# Patient Record
Sex: FEMALE | Race: WHITE | NOT HISPANIC OR LATINO | Employment: UNEMPLOYED | URBAN - METROPOLITAN AREA
[De-identification: names, ages, dates, MRNs, and addresses within clinical notes are randomized per-mention and may not be internally consistent; named-entity substitution may affect disease eponyms.]

---

## 2021-04-11 ENCOUNTER — APPOINTMENT (EMERGENCY)
Dept: RADIOLOGY | Facility: HOSPITAL | Age: 46
DRG: 189 | End: 2021-04-11
Payer: MEDICARE

## 2021-04-11 ENCOUNTER — HOSPITAL ENCOUNTER (INPATIENT)
Facility: HOSPITAL | Age: 46
LOS: 7 days | Discharge: HOME HEALTH | DRG: 189 | End: 2021-04-18
Attending: EMERGENCY MEDICINE | Admitting: INTERNAL MEDICINE
Payer: MEDICARE

## 2021-04-11 DIAGNOSIS — F03.90 DEMENTIA WITHOUT BEHAVIORAL DISTURBANCE, UNSPECIFIED DEMENTIA TYPE (HCC): ICD-10-CM

## 2021-04-11 DIAGNOSIS — R09.02 HYPOXIA: ICD-10-CM

## 2021-04-11 DIAGNOSIS — T17.908A ASPIRATION INTO AIRWAY, INITIAL ENCOUNTER: ICD-10-CM

## 2021-04-11 DIAGNOSIS — E87.2 LACTIC ACIDOSIS: ICD-10-CM

## 2021-04-11 DIAGNOSIS — R71.8 ELEVATED MCV: ICD-10-CM

## 2021-04-11 DIAGNOSIS — Q90.9 DOWN SYNDROME: ICD-10-CM

## 2021-04-11 DIAGNOSIS — T17.308A CHOKING, INITIAL ENCOUNTER: Primary | ICD-10-CM

## 2021-04-11 PROBLEM — E87.29 RESPIRATORY ACIDOSIS: Status: ACTIVE | Noted: 2021-04-11

## 2021-04-11 PROBLEM — I46.9 CARDIAC ARREST (HCC): Status: ACTIVE | Noted: 2021-04-11

## 2021-04-11 PROBLEM — R79.89 ELEVATED LACTIC ACID LEVEL: Status: ACTIVE | Noted: 2021-04-11

## 2021-04-11 LAB
ALBUMIN SERPL BCP-MCNC: 3.1 G/DL (ref 3.5–5)
ALP SERPL-CCNC: 183 U/L (ref 46–116)
ALT SERPL W P-5'-P-CCNC: 67 U/L (ref 12–78)
ANION GAP SERPL CALCULATED.3IONS-SCNC: 8 MMOL/L (ref 4–13)
AST SERPL W P-5'-P-CCNC: 57 U/L (ref 5–45)
ATRIAL RATE: 375 BPM
BASE EX.OXY STD BLDV CALC-SCNC: 73.5 % (ref 60–80)
BASE EX.OXY STD BLDV CALC-SCNC: 79.2 % (ref 60–80)
BASE EXCESS BLDV CALC-SCNC: -0.6 MMOL/L
BASE EXCESS BLDV CALC-SCNC: 2.5 MMOL/L
BASOPHILS # BLD MANUAL: 0.05 THOUSAND/UL (ref 0–0.1)
BASOPHILS NFR MAR MANUAL: 1 % (ref 0–1)
BILIRUB SERPL-MCNC: 0.62 MG/DL (ref 0.2–1)
BUN SERPL-MCNC: 20 MG/DL (ref 5–25)
CALCIUM ALBUM COR SERPL-MCNC: 8.4 MG/DL (ref 8.3–10.1)
CALCIUM SERPL-MCNC: 7.7 MG/DL (ref 8.3–10.1)
CHLORIDE SERPL-SCNC: 105 MMOL/L (ref 100–108)
CO2 SERPL-SCNC: 29 MMOL/L (ref 21–32)
CREAT SERPL-MCNC: 1.18 MG/DL (ref 0.6–1.3)
EOSINOPHIL # BLD MANUAL: 0 THOUSAND/UL (ref 0–0.4)
EOSINOPHIL NFR BLD MANUAL: 0 % (ref 0–6)
ERYTHROCYTE [DISTWIDTH] IN BLOOD BY AUTOMATED COUNT: 14.4 % (ref 11.6–15.1)
GFR SERPL CREATININE-BSD FRML MDRD: 55 ML/MIN/1.73SQ M
GLUCOSE SERPL-MCNC: 147 MG/DL (ref 65–140)
HCG SERPL QL: NEGATIVE
HCO3 BLDV-SCNC: 28.1 MMOL/L (ref 24–30)
HCO3 BLDV-SCNC: 29.6 MMOL/L (ref 24–30)
HCT VFR BLD AUTO: 40.3 % (ref 34.8–46.1)
HGB BLD-MCNC: 13.1 G/DL (ref 11.5–15.4)
INR PPP: 1.02 (ref 0.84–1.19)
LACTATE SERPL-SCNC: 1.5 MMOL/L (ref 0.5–2)
LACTATE SERPL-SCNC: 2.7 MMOL/L (ref 0.5–2)
LYMPHOCYTES # BLD AUTO: 0.94 THOUSAND/UL (ref 0.6–4.47)
LYMPHOCYTES # BLD AUTO: 18 % (ref 14–44)
MCH RBC QN AUTO: 34.4 PG (ref 26.8–34.3)
MCHC RBC AUTO-ENTMCNC: 32.5 G/DL (ref 31.4–37.4)
MCV RBC AUTO: 106 FL (ref 82–98)
MONOCYTES # BLD AUTO: 0.26 THOUSAND/UL (ref 0–1.22)
MONOCYTES NFR BLD: 5 % (ref 4–12)
NEUTROPHILS # BLD MANUAL: 3.97 THOUSAND/UL (ref 1.85–7.62)
NEUTS BAND NFR BLD MANUAL: 5 % (ref 0–8)
NEUTS SEG NFR BLD AUTO: 71 % (ref 43–75)
NRBC BLD AUTO-RTO: 0 /100 WBCS
NT-PROBNP SERPL-MCNC: 58 PG/ML
O2 CT BLDV-SCNC: 14.6 ML/DL
O2 CT BLDV-SCNC: 15.6 ML/DL
PCO2 BLDV: 56.2 MM HG (ref 42–50)
PCO2 BLDV: 64.6 MM HG (ref 42–50)
PH BLDV: 7.26 [PH] (ref 7.3–7.4)
PH BLDV: 7.34 [PH] (ref 7.3–7.4)
PLATELET # BLD AUTO: 157 THOUSANDS/UL (ref 149–390)
PLATELET BLD QL SMEAR: ADEQUATE
PMV BLD AUTO: 11.3 FL (ref 8.9–12.7)
PO2 BLDV: 42.9 MM HG (ref 35–45)
PO2 BLDV: 44.1 MM HG (ref 35–45)
POTASSIUM SERPL-SCNC: 3.7 MMOL/L (ref 3.5–5.3)
PROT SERPL-MCNC: 7.3 G/DL (ref 6.4–8.2)
PROTHROMBIN TIME: 13.5 SECONDS (ref 11.6–14.5)
QRS AXIS: 59 DEGREES
QRSD INTERVAL: 72 MS
QT INTERVAL: 378 MS
QTC INTERVAL: 408 MS
RBC # BLD AUTO: 3.81 MILLION/UL (ref 3.81–5.12)
SODIUM SERPL-SCNC: 142 MMOL/L (ref 136–145)
T WAVE AXIS: 54 DEGREES
TOTAL CELLS COUNTED SPEC: 100
TROPONIN I SERPL-MCNC: 0.06 NG/ML
TROPONIN I SERPL-MCNC: <0.02 NG/ML
VENTRICULAR RATE: 70 BPM
WBC # BLD AUTO: 5.22 THOUSAND/UL (ref 4.31–10.16)

## 2021-04-11 PROCEDURE — 84484 ASSAY OF TROPONIN QUANT: CPT | Performed by: EMERGENCY MEDICINE

## 2021-04-11 PROCEDURE — 93005 ELECTROCARDIOGRAM TRACING: CPT

## 2021-04-11 PROCEDURE — 36415 COLL VENOUS BLD VENIPUNCTURE: CPT | Performed by: EMERGENCY MEDICINE

## 2021-04-11 PROCEDURE — 85027 COMPLETE CBC AUTOMATED: CPT | Performed by: EMERGENCY MEDICINE

## 2021-04-11 PROCEDURE — 87040 BLOOD CULTURE FOR BACTERIA: CPT | Performed by: EMERGENCY MEDICINE

## 2021-04-11 PROCEDURE — 80053 COMPREHEN METABOLIC PANEL: CPT | Performed by: EMERGENCY MEDICINE

## 2021-04-11 PROCEDURE — 99223 1ST HOSP IP/OBS HIGH 75: CPT | Performed by: INTERNAL MEDICINE

## 2021-04-11 PROCEDURE — 99291 CRITICAL CARE FIRST HOUR: CPT

## 2021-04-11 PROCEDURE — 99285 EMERGENCY DEPT VISIT HI MDM: CPT | Performed by: EMERGENCY MEDICINE

## 2021-04-11 PROCEDURE — 71045 X-RAY EXAM CHEST 1 VIEW: CPT

## 2021-04-11 PROCEDURE — 84703 CHORIONIC GONADOTROPIN ASSAY: CPT | Performed by: EMERGENCY MEDICINE

## 2021-04-11 PROCEDURE — 85610 PROTHROMBIN TIME: CPT | Performed by: EMERGENCY MEDICINE

## 2021-04-11 PROCEDURE — 82805 BLOOD GASES W/O2 SATURATION: CPT | Performed by: EMERGENCY MEDICINE

## 2021-04-11 PROCEDURE — 83605 ASSAY OF LACTIC ACID: CPT | Performed by: EMERGENCY MEDICINE

## 2021-04-11 PROCEDURE — 84484 ASSAY OF TROPONIN QUANT: CPT | Performed by: INTERNAL MEDICINE

## 2021-04-11 PROCEDURE — 94664 DEMO&/EVAL PT USE INHALER: CPT

## 2021-04-11 PROCEDURE — 83605 ASSAY OF LACTIC ACID: CPT | Performed by: INTERNAL MEDICINE

## 2021-04-11 PROCEDURE — 85007 BL SMEAR W/DIFF WBC COUNT: CPT | Performed by: EMERGENCY MEDICINE

## 2021-04-11 PROCEDURE — 84145 PROCALCITONIN (PCT): CPT | Performed by: EMERGENCY MEDICINE

## 2021-04-11 PROCEDURE — 96365 THER/PROPH/DIAG IV INF INIT: CPT

## 2021-04-11 PROCEDURE — 83880 ASSAY OF NATRIURETIC PEPTIDE: CPT | Performed by: EMERGENCY MEDICINE

## 2021-04-11 PROCEDURE — 93010 ELECTROCARDIOGRAM REPORT: CPT | Performed by: INTERNAL MEDICINE

## 2021-04-11 RX ORDER — OLANZAPINE 5 MG/1
5 TABLET ORAL 2 TIMES DAILY
COMMUNITY

## 2021-04-11 RX ORDER — LORAZEPAM 1 MG/1
1 TABLET ORAL EVERY 8 HOURS PRN
Status: DISCONTINUED | OUTPATIENT
Start: 2021-04-11 | End: 2021-04-18 | Stop reason: HOSPADM

## 2021-04-11 RX ORDER — LORAZEPAM 1 MG/1
0.5 TABLET ORAL 3 TIMES DAILY
COMMUNITY

## 2021-04-11 RX ORDER — QUETIAPINE FUMARATE 25 MG/1
25 TABLET, FILM COATED ORAL DAILY
Status: DISCONTINUED | OUTPATIENT
Start: 2021-04-12 | End: 2021-04-18 | Stop reason: HOSPADM

## 2021-04-11 RX ORDER — ESCITALOPRAM OXALATE 20 MG/1
20 TABLET ORAL
Status: DISCONTINUED | OUTPATIENT
Start: 2021-04-11 | End: 2021-04-18 | Stop reason: HOSPADM

## 2021-04-11 RX ORDER — ESCITALOPRAM OXALATE 10 MG/1
10 TABLET ORAL DAILY
COMMUNITY

## 2021-04-11 RX ORDER — KETAMINE HCL IN NACL, ISO-OSM 100MG/10ML
1 SYRINGE (ML) INJECTION ONCE
Status: DISCONTINUED | OUTPATIENT
Start: 2021-04-11 | End: 2021-04-11

## 2021-04-11 RX ORDER — CLONAZEPAM 1 MG/1
1 TABLET ORAL
Status: ON HOLD | COMMUNITY
End: 2022-03-16

## 2021-04-11 RX ORDER — QUETIAPINE FUMARATE 25 MG/1
12.5 TABLET, FILM COATED ORAL 2 TIMES DAILY
COMMUNITY

## 2021-04-11 RX ORDER — KETAMINE HCL IN NACL, ISO-OSM 100MG/10ML
SYRINGE (ML) INJECTION
Status: DISCONTINUED
Start: 2021-04-11 | End: 2021-04-11

## 2021-04-11 RX ORDER — ESCITALOPRAM OXALATE 10 MG/1
10 TABLET ORAL DAILY
Status: DISCONTINUED | OUTPATIENT
Start: 2021-04-12 | End: 2021-04-18 | Stop reason: HOSPADM

## 2021-04-11 RX ORDER — QUETIAPINE FUMARATE 50 MG/1
50 TABLET, FILM COATED ORAL
COMMUNITY
End: 2022-03-10

## 2021-04-11 RX ORDER — KETAMINE HYDROCHLORIDE 50 MG/ML
INJECTION, SOLUTION, CONCENTRATE INTRAMUSCULAR; INTRAVENOUS
Status: DISCONTINUED
Start: 2021-04-11 | End: 2021-04-11

## 2021-04-11 RX ORDER — DONEPEZIL HYDROCHLORIDE 5 MG/1
5 TABLET, FILM COATED ORAL
Status: DISCONTINUED | OUTPATIENT
Start: 2021-04-11 | End: 2021-04-18 | Stop reason: HOSPADM

## 2021-04-11 RX ORDER — QUETIAPINE FUMARATE 25 MG/1
50 TABLET, FILM COATED ORAL
Status: DISCONTINUED | OUTPATIENT
Start: 2021-04-11 | End: 2021-04-18 | Stop reason: HOSPADM

## 2021-04-11 RX ORDER — ESCITALOPRAM OXALATE 20 MG/1
20 TABLET ORAL
COMMUNITY
End: 2021-05-04 | Stop reason: SDUPTHER

## 2021-04-11 RX ORDER — OLANZAPINE 20 MG/1
5 TABLET ORAL DAILY
COMMUNITY
End: 2022-03-10

## 2021-04-11 RX ORDER — CLONAZEPAM 1 MG/1
1 TABLET ORAL 2 TIMES DAILY
Status: DISCONTINUED | OUTPATIENT
Start: 2021-04-11 | End: 2021-04-18 | Stop reason: HOSPADM

## 2021-04-11 RX ORDER — DONEPEZIL HYDROCHLORIDE 5 MG/1
5 TABLET, FILM COATED ORAL
COMMUNITY

## 2021-04-11 RX ORDER — SODIUM CHLORIDE 9 MG/ML
75 INJECTION, SOLUTION INTRAVENOUS CONTINUOUS
Status: DISCONTINUED | OUTPATIENT
Start: 2021-04-11 | End: 2021-04-14

## 2021-04-11 RX ORDER — OLANZAPINE 2.5 MG/1
5 TABLET ORAL 2 TIMES DAILY
Status: DISCONTINUED | OUTPATIENT
Start: 2021-04-12 | End: 2021-04-18 | Stop reason: HOSPADM

## 2021-04-11 RX ORDER — OLANZAPINE 10 MG/1
10 TABLET ORAL
Status: DISCONTINUED | OUTPATIENT
Start: 2021-04-11 | End: 2021-04-18 | Stop reason: HOSPADM

## 2021-04-11 RX ADMIN — DONEPEZIL HYDROCHLORIDE 5 MG: 5 TABLET, FILM COATED ORAL at 21:49

## 2021-04-11 RX ADMIN — QUETIAPINE FUMARATE 50 MG: 25 TABLET ORAL at 21:49

## 2021-04-11 RX ADMIN — AMPICILLIN SODIUM AND SULBACTAM SODIUM 3 G: 2; 1 INJECTION, POWDER, FOR SOLUTION INTRAMUSCULAR; INTRAVENOUS at 15:36

## 2021-04-11 RX ADMIN — SODIUM CHLORIDE 100 ML/HR: 0.9 INJECTION, SOLUTION INTRAVENOUS at 18:19

## 2021-04-11 RX ADMIN — OLANZAPINE 10 MG: 10 TABLET, FILM COATED ORAL at 21:49

## 2021-04-11 RX ADMIN — ESCITALOPRAM OXALATE 20 MG: 20 TABLET ORAL at 21:49

## 2021-04-11 RX ADMIN — LORAZEPAM 1 MG: 1 TABLET ORAL at 20:09

## 2021-04-11 NOTE — ED PROVIDER NOTES
History  Chief Complaint   Patient presents with    Aspiration w/severe Dyspnea     Pt choked on pie at group home and went unresponsive  CPR with no shock and achieved ROSC  SpO2 80s for EMS     HPI       25-year-old female with history of Down syndrome and dementia, nonverbal at baseline, presenting after choking on a piece of cherry pie  Patient went unresponsive at home shortly after choking  When EMS arrived at the scene they performed the Heimlich maneuver  She lost pulses so CPR was initiated  After a couple of rounds of CPR she had ROSC   EMS had difficulty oxygenating her EN route to the emergency department due to agitation  She was satting 73% on room air on arrival     I removed a few chunks of cherry pie from the patient's oral cavity on arrival   She was placed on mid flow nasal cannula at 8 L and is maintaining SpO2 in the high 90s  Lungs are clear to auscultation  Chest x-ray without consolidation  Patient is noted to be hypothermic to 95 5° on arrival   She was placed on a [employer]  She has a lactic acidosis to 2 7  No leukocytosis  She was covered empirically with Unasyn given concern for significant aspiration  Patient only meets 1 SIRS criteria in the emergency department and I suspect that her lactic acidosis is in the setting of prolonged episode of hypoxia rather than severe sepsis  Patient is alert and interactive at her baseline in the emergency department  She was evaluated at bedside by the critical care attending and nurse practitioner who agree that she is stable for the floor  She was admitted to [facility] in stable condition  Prior to Admission Medications   Prescriptions Last Dose Informant Patient Reported? Taking?    LORazepam (ATIVAN) 1 mg tablet 4/11/2021 at Unknown time Family Member Yes Yes   Sig: Take 1 mg by mouth every 8 (eight) hours as needed for anxiety   OLANZapine (ZyPREXA) 20 MG tablet 4/10/2021 at Unknown time Family Member Yes Yes   Sig: Take 10 mg by mouth daily at bedtime   OLANZapine (ZyPREXA) 5 mg tablet 4/11/2021 at Unknown time Family Member Yes Yes   Sig: Take 5 mg by mouth 2 (two) times a day   QUEtiapine (SEROquel) 25 mg tablet 4/11/2021 at Unknown time Family Member Yes Yes   Sig: Take 25 mg by mouth daily 12 noon   QUEtiapine (SEROquel) 50 mg tablet 4/10/2021 at Unknown time Family Member Yes Yes   Sig: Take 50 mg by mouth daily at bedtime   clonazePAM (KlonoPIN) 1 mg tablet 4/11/2021 at Unknown time Family Member Yes Yes   Sig: Take 1 mg by mouth 2 (two) times a day   donepezil (ARICEPT) 5 mg tablet 4/10/2021 at Unknown time Family Member Yes Yes   Sig: Take 5 mg by mouth daily at bedtime   escitalopram (LEXAPRO) 10 mg tablet 4/11/2021 at Unknown time Family Member Yes Yes   Sig: Take 10 mg by mouth daily   escitalopram (LEXAPRO) 20 mg tablet 4/10/2021 at Unknown time Family Member Yes Yes   Sig: Take 20 mg by mouth daily at bedtime      Facility-Administered Medications: None       Past Medical History:   Diagnosis Date    Anxiety     Dementia ([facility] [address] )     Depression     Down syndrome        History reviewed  No pertinent surgical history  History reviewed  No pertinent family history  I have reviewed and agree with the history as documented  E-Cigarette/Vaping     E-Cigarette/Vaping Substances    Nicotine No     Flavoring No      Social History     Tobacco Use    Smoking status: Never Smoker    Smokeless tobacco: Never Used   Substance Use Topics    Alcohol use: Never     Frequency: Never    Drug use: Never       Review of Systems   Unable to perform ROS: Patient nonverbal       Physical Exam  Physical Exam  Constitutional:       General: She is in acute distress  Appearance: She is well-developed  She is not diaphoretic  HENT:      Head: Normocephalic and atraumatic        Right Ear: External ear normal       Left Ear: External ear normal       Nose: Nose normal       Mouth/Throat:      Comments: Drooling, chunks of pie present in the oral cavity  Eyes:      Extraocular Movements: Extraocular movements intact  Conjunctiva/sclera: Conjunctivae normal       Pupils: Pupils are equal, round, and reactive to light  Neck:      Musculoskeletal: Normal range of motion and neck supple  Cardiovascular:      Rate and Rhythm: Normal rate and regular rhythm  Heart sounds: Normal heart sounds  No murmur  No friction rub  No gallop  Pulmonary:      Breath sounds: Normal breath sounds  No wheezing or rales  Comments: Initially with increased work of breathing  Work of breathing returned to normal once chunks of pie were removed from the oral cavity  Lungs clear to auscultation  Abdominal:      General: There is no distension  Palpations: Abdomen is soft  Tenderness: There is no abdominal tenderness  Musculoskeletal:         General: No deformity  Skin:     General: Skin is warm and dry  Neurological:      Mental Status: She is alert  Motor: No abnormal muscle tone  Comments: Awake, alert  Recognizes her name, occasionally follows simple commands  Trying to pull out her nasal cannula and IV  At her baseline per her family            Vital Signs  ED Triage Vitals   Temperature Pulse Respirations Blood Pressure SpO2   04/11/21 1457 04/11/21 1421 04/11/21 1437 04/11/21 1437 04/11/21 1426   (!) 95 5 °F (35 3 °C) 83 16 107/67 (!) 77 %      Temp Source Heart Rate Source Patient Position - Orthostatic VS BP Location FiO2 (%)   04/11/21 1457 04/11/21 1421 04/11/21 1437 04/11/21 1437 --   Rectal Monitor Lying Right arm       Pain Score       04/11/21 1437       No Pain           Vitals:    04/11/21 1557 04/11/21 1625 04/11/21 1630 04/11/21 1700   BP: 118/66  113/57 101/51   Pulse: 75 (!) 52 (!) 52 (!) 50   Patient Position - Orthostatic VS: Sitting Lying Lying Lying         Visual Acuity  Visual Acuity      Most Recent Value   L Pupil Size (mm)  3   R Pupil Size (mm)  3          ED Medications  Medications ampicillin-sulbactam (UNASYN) 3 g in sodium chloride 0 9 % 100 mL IVPB (0 g Intravenous Stopped 4/11/21 1623)       Diagnostic Studies  Results Reviewed     Procedure Component Value Units Date/Time    Lactic acid, plasma [[id number]]  (Abnormal) Collected: 04/11/21 1541    Lab Status: Final result Specimen: Blood from Arm, Left Updated: 04/11/21 1647     LACTIC ACID 2 7 mmol/L     Narrative:      Result may be elevated if tourniquet was used during collection  Lactic acid 2 Hours [[id number]]     Lab Status: No result Specimen: Blood     Blood gas, venous [[id number]]  (Abnormal) Collected: 04/11/21 1641    Lab Status: Final result Specimen: Blood from Arm, Right Updated: 04/11/21 1646     pH, Andrea 7 339     pCO2, Andrea 56 2 mm Hg      pO2, Andrea 44 1 mm Hg      HCO3, Andrea 29 6 mmol/L      Base Excess, Andrea 2 5 mmol/L      O2 Content, Andrea 15 6 ml/dL      O2 HGB, VENOUS 79 2 %     Protime-INR [[id number]]  (Normal) Collected: 04/11/21 1541    Lab Status: Final result Specimen: Blood from Arm, Left Updated: 04/11/21 1620     Protime 13 5 seconds      INR 1 02    Blood gas, venous [[id number]]  (Abnormal) Collected: 04/11/21 1602    Lab Status: Final result Specimen: Blood from Arm, Right Updated: 04/11/21 1606     pH, Andrea 7 256     pCO2, Andrea 64 6 mm Hg      pO2, Andrea 42 9 mm Hg      HCO3, Andrea 28 1 mmol/L      Base Excess, Andrea -0 6 mmol/L      O2 Content, Andrea 14 6 ml/dL      O2 HGB, VENOUS 73 5 %     CBC and differential [[id number]]  (Abnormal) Collected: 04/11/21 1501    Lab Status: Final result Specimen: Blood from Arm, Left Updated: 04/11/21 1547     WBC 5 22 Thousand/uL      RBC 3 81 Million/uL      Hemoglobin 13 1 g/dL      Hematocrit 40 3 %       fL      MCH 34 4 pg      MCHC 32 5 g/dL      RDW 14 4 %      MPV 11 3 fL      Platelets 255 Thousands/uL      nRBC 0 /100 WBCs     Narrative: This is an appended report  These results have been appended to a previously verified report      Manual Differential(PHLEBS Do Not Order) [[id number]] Collected: 04/11/21 1501    Lab Status: Final result Specimen: Blood from Arm, Left Updated: 04/11/21 1547     Segmented % 71 %      Bands % 5 %      Lymphocytes % 18 %      Monocytes % 5 %      Eosinophils, % 0 %      Basophils % 1 %      Absolute Neutrophils 3 97 Thousand/uL      Lymphocytes Absolute 0 94 Thousand/uL      Monocytes Absolute 0 26 Thousand/uL      Eosinophils Absolute 0 00 Thousand/uL      Basophils Absolute 0 05 Thousand/uL      Total Counted 100     Platelet Estimate Adequate    Procalcitonin with AM Reflex [[id number]] Collected: 04/11/21 1541    Lab Status: In process Specimen: Blood from Arm, Left Updated: 04/11/21 1545    Blood culture #1 [[id number]] Collected: 04/11/21 1536    Lab Status: In process Specimen: Blood from Arm, Right Updated: 04/11/21 1542    Blood culture #2 [[id number]] Collected: 04/11/21 1501    Lab Status:  In process Specimen: Blood from Arm, Left Updated: 04/11/21 1542    hCG, qualitative pregnancy [[id number]]  (Normal) Collected: 04/11/21 1500    Lab Status: Final result Specimen: Blood from Arm, Left Updated: 04/11/21 1541     Preg, Serum Negative    NT-BNP PRO [[id number]]  (Normal) Collected: 04/11/21 1500    Lab Status: Final result Specimen: Blood from Arm, Left Updated: 04/11/21 1539     NT-proBNP 58 pg/mL     Troponin I [[id number]]  (Normal) Collected: 04/11/21 1500    Lab Status: Final result Specimen: Blood from Arm, Left Updated: 04/11/21 1534     Troponin I <0 02 ng/mL     Comprehensive metabolic panel [[id number]]  (Abnormal) Collected: 04/11/21 1500    Lab Status: Final result Specimen: Blood from Arm, Left Updated: 04/11/21 1532     Sodium 142 mmol/L      Potassium 3 7 mmol/L      Chloride 105 mmol/L      CO2 29 mmol/L      ANION GAP 8 mmol/L      BUN 20 mg/dL      Creatinine 1 18 mg/dL      Glucose 147 mg/dL      Calcium 7 7 mg/dL      Corrected Calcium 8 4 mg/dL      AST 57 U/L      ALT 67 U/L      Alkaline Phosphatase 183 U/L Total Protein 7 3 g/dL      Albumin 3 1 g/dL      Total Bilirubin 0 62 mg/dL      eGFR 55 ml/min/1 73sq m     Narrative:      [place] guidelines for Chronic Kidney Disease (CKD):     Stage 1 with normal or high GFR (GFR > 90 mL/min/1 73 square meters)    Stage 2 Mild CKD (GFR = 60-89 mL/min/1 73 square meters)    Stage 3A Moderate CKD (GFR = 45-59 mL/min/1 73 square meters)    Stage 3B Moderate CKD (GFR = 30-44 mL/min/1 73 square meters)    Stage 4 Severe CKD (GFR = 15-29 mL/min/1 73 square meters)    Stage 5 End Stage CKD (GFR <15 mL/min/1 73 square meters)  Note: GFR calculation is accurate only with a steady state creatinine                 XR chest 1 view portable    (Results Pending)              Procedures  CriticalCare Time  Performed by: Que Allred MD  Authorized by: Que Allred MD     Critical care provider statement:     Critical care time (minutes):  45    Critical care time was exclusive of:  Separately billable procedures and treating other patients and teaching time    Critical care was necessary to treat or prevent imminent or life-threatening deterioration of the following conditions: hypoxia requiring mid-flow nasal cannula  Post cardiac arrest care      Critical care was time spent personally by me on the following activities:  Development of treatment plan with patient or surrogate, obtaining history from patient or surrogate, evaluation of patient's response to treatment, examination of patient, discussions with consultants, ordering and review of radiographic studies, re-evaluation of patient's condition, ordering and review of laboratory studies and ordering and performing treatments and interventions    I assumed direction of critical care for this patient from another provider in my specialty: no               ED Course                             SBIRT 22yo+      Most Recent Value   SBIRT (24 yo +)   In order to provide better care to our patients, we are screening all of our patients for alcohol and drug use  Would it be okay to ask you these screening questions? Yes Filed at: 04/11/2021 1454   Initial Alcohol Screen: US AUDIT-C    1  How often do you have a drink containing alcohol?  0 Filed at: 04/11/2021 1454   2  How many drinks containing alcohol do you have on a typical day you are drinking? 0 Filed at: 04/11/2021 1454   3a  Male UNDER 65: How often do you have five or more drinks on one occasion? 0 Filed at: 04/11/2021 1454   3b  FEMALE Any Age, or MALE 65+: How often do you have 4 or more drinks on one occassion? 0 Filed at: 04/11/2021 1454   Audit-C Score  0 Filed at: 04/11/2021 1454   MIRIAM: How many times in the past year have you    Used an illegal drug or used a prescription medication for non-medical reasons?   Never Filed at: 04/11/2021 1454                    MDM  Number of Diagnoses or Management Options  Aspiration into airway, initial encounter: new and requires workup  Choking, initial encounter: new and requires workup  Hypoxia: new and requires workup  Lactic acidosis: new and requires workup  Diagnosis management comments: Please see HPI above       Amount and/or Complexity of Data Reviewed  Clinical lab tests: ordered and reviewed  Tests in the radiology section of CPT®: ordered and reviewed  Obtain history from someone other than the patient: yes  Review and summarize past medical records: yes  Discuss the patient with other providers: yes  Independent visualization of images, tracings, or specimens: yes    Risk of Complications, Morbidity, and/or Mortality  Presenting problems: high  Diagnostic procedures: low  Management options: moderate    Patient Progress  Patient progress: improved      Disposition  Final diagnoses:   Choking, initial encounter   Aspiration into airway, initial encounter   Lactic acidosis   Hypoxia     Time reflects when diagnosis was documented in both MDM as applicable and the Disposition within this note     Time User Action Codes Description Comment    4/11/2021  5:18 PM Liza Mendoza [M86 306M] Choking, initial encounter     4/11/2021  5:18 PM Radha Nguyễn [N44 095O] Aspiration into airway, initial encounter     4/11/2021  5:18 PM Radha Estrada Add [E87 2] Lactic acidosis     4/11/2021  5:19 PM Radha Estrada Add [R09 02] Hypoxia       ED Disposition     ED Disposition Condition Date/Time Comment    Admit Stable Sun Apr 11, 2021  5:18 PM Case was discussed with JANY and the patient's admission status was agreed to be Admission Status: inpatient status to the service of Dr Omayra Bates  Follow-up Information    None         Patient's Medications   Discharge Prescriptions    No medications on file     No discharge procedures on file      PDMP Review     None          ED Provider  Electronically Signed by           No Norman MD  04/11/21 [id number]

## 2021-04-11 NOTE — ASSESSMENT & PLAN NOTE
Reported loss of pulses per EMS likely secondary to choking/aspiration  CPR was initiated and ROSC was achieved      · Continue oxygen supplements and titrate O2 to maintain SpO2 above 88%  · Patient is currently alert and at baseline  · Continuous pulse oximetry

## 2021-04-11 NOTE — ASSESSMENT & PLAN NOTE
Evidence by ABG on admission, likely secondary to choking/aspiration  Repeat ABG with normal pH    Resolved    · Continue to monitor respiratory status

## 2021-04-11 NOTE — H&P
[address] Sandy Wong 7/1/1974, 48 y o  female MRN: [id number]  Unit/Bed#: ED 16 Encounter: [id number]  Primary Care Provider: Agatha Carr MD   Date and time admitted to hospital: 4/11/2021  2:19 PM    * Acute respiratory failure with hypoxia [facility])  Assessment & Plan  Likely secondary to choking/aspiration  On admission, satting 70% placed on high-flow which then titrated down to 6 L nasal     Plan:  · Continue oxygen supplements titrate to maintain SpO2 above 88%  · Hold on further antibiotics (received Unasyn in ED)  · Check procalcitonin  · Follow-up on chest x-ray  · Monitor fever curves and WBC count  · Aspiration precautions  · Respiratory protocol  · NPO until seen and evaluated by speech    Cardiac arrest [facility])  Assessment & Plan  Reported loss of pulses per EMS likely secondary to choking/aspiration  CPR was initiated and ROSC was achieved  · Continue oxygen supplements and titrate O2 to maintain SpO2 above 88%  · Patient is currently alert and at baseline  · Continuous pulse oximetry      Respiratory acidosis  Assessment & Plan  Evidence by ABG on admission, likely secondary to choking/aspiration  Repeat ABG with normal pH  Resolved    · Continue to monitor respiratory status    Elevated lactic acid level  Assessment & Plan  Likely secondary to hypoxia  · Trend lactic till normal  · IV fluid for hydration while NPO    Anxiety  Assessment & Plan  Continue home medications of Lexapro and Ativan p r n  Dementia ([facility] [place] [address] )  Assessment & Plan  Continue donepezil, Zyprexa and Seroquel home medication  Supportive care    Down syndrome  Assessment & Plan  Continue supportive care  VTE Prophylaxis: No indication, low risk  / reason for no mechanical VTE prophylaxis No indication, low risk   Code Status:  Level 1-full code  POLST: POLST form is not discussed and not completed at this time      Anticipated Length of Stay:  Patient will be admitted on an Inpatient basis with an anticipated length of stay of  greater than 2 midnights  Justification for Hospital Stay:  Acute respiratory failure with hypoxia secondary to choking aspiration and cardiac arrest    Chief Complaint:   Hypoxia    History of Present Illness:    Mitch Munoz is a 48 y o  female with past medical history significant for Down syndrome, dementia, anxiety who presents with hypoxia  Earlier today patient choked on cherry pie and lost consciousness  By EMS report, Heimlich maneuver was performed but patient was noted not to have pulse therefore CPR was initiated and Rosc was achieved  Patient was placed on high-flow nasal cannula which then titrated down to 6 L nasal cannula with stable and well saturation  Patient became more alert and back to baseline which is nonverbal   Therefore history was not able to obtain point patient and was mainly obtained from her brother and stepmother  Reported that patient usually eats without difficulty and swallowing and denied any choking or coughing while eating or taking medications however it is always supervised accept this time when she had the cherry pie without observation  Patient's family denies any complaints from patient prior to the event  Her baseline is nonverbal pleasant however gets agitated and anxious if not around family  Patient is not very active however she can understand basic commands from family only  Patient was evaluated by critical care team who recommended floor admission  When examined, patient is alert however none verbal   She does not look in distress  She was saturating well with no evidence of labored breathing on 6 L nasal cannula  She was able to move all of her extremities however she was not able to follow any commands  I discussed with hospital supervisor who agreed to keep 1 family member with the patient given her baseline      Patient will be admitted to [facility] inpatient service for further management and workup of acute respiratory failure with hypoxia  Review of Systems:    Review of Systems   Unable to perform ROS: Dementia       Past Medical and Surgical History:     Past Medical History:   Diagnosis Date    Anxiety     Dementia ([facility] [address] )     Depression     Down syndrome        History reviewed  No pertinent surgical history  Meds/Allergies:    Prior to Admission medications    Medication Sig Start Date End Date Taking? Authorizing Provider   clonazePAM (KlonoPIN) 1 mg tablet Take 1 mg by mouth 2 (two) times a day   Yes Historical Provider, MD   donepezil (ARICEPT) 5 mg tablet Take 5 mg by mouth daily at bedtime   Yes Historical Provider, MD   escitalopram (LEXAPRO) 10 mg tablet Take 10 mg by mouth daily   Yes Historical Provider, MD   escitalopram (LEXAPRO) 20 mg tablet Take 20 mg by mouth daily at bedtime   Yes Historical Provider, MD   LORazepam (ATIVAN) 1 mg tablet Take 1 mg by mouth every 8 (eight) hours as needed for anxiety   Yes Historical Provider, MD   OLANZapine (ZyPREXA) 20 MG tablet Take 10 mg by mouth daily at bedtime   Yes Historical Provider, MD   OLANZapine (ZyPREXA) 5 mg tablet Take 5 mg by mouth 2 (two) times a day   Yes Historical Provider, MD   QUEtiapine (SEROquel) 25 mg tablet Take 25 mg by mouth daily 12 noon   Yes Historical Provider, MD   QUEtiapine (SEROquel) 50 mg tablet Take 50 mg by mouth daily at bedtime   Yes Historical Provider, MD     I have reviewed home medications with patient personally      Allergies: No Known Allergies    Social History:     Marital Status: Single   Occupation:   Patient Pre-hospital Living Situation:  Home with family  Patient Pre-hospital Level of Mobility:  Family assistance  Patient Pre-hospital Diet Restrictions:  None  Substance Use History:   Social History     Substance and Sexual Activity   Alcohol Use Never    Frequency: Never     Social History     Tobacco Use   Smoking Status Never Smoker   Smokeless Tobacco Never Used     Social History Substance and Sexual Activity   Drug Use Never       Family History:    History reviewed  No pertinent family history  Physical Exam:     Vitals:   Blood Pressure: 106/55 (04/11/21 1830)  Pulse: 70 (04/11/21 1830)  Temperature: 98 °F (36 7 °C) (04/11/21 1830)  Temp Source: Axillary (04/11/21 1830)  Respirations: 16 (04/11/21 1830)  Weight - Scale: 59 7 kg (131 lb 9 8 oz) (04/11/21 1427)  SpO2: 97 % (04/11/21 1830)    Physical Exam  Vitals signs and nursing note reviewed  Constitutional:       General: She is not in acute distress  Appearance: She is not ill-appearing or diaphoretic  HENT:      Head: Normocephalic and atraumatic  Mouth/Throat:      Mouth: Mucous membranes are moist       Pharynx: Oropharynx is clear  Eyes:      Extraocular Movements: Extraocular movements intact  Conjunctiva/sclera: Conjunctivae normal    Neck:      Musculoskeletal: Neck supple  Cardiovascular:      Rate and Rhythm: Normal rate and regular rhythm  Heart sounds: Normal heart sounds  No murmur  Pulmonary:      Effort: Pulmonary effort is normal  No respiratory distress  Breath sounds: Normal breath sounds  No wheezing or rales  Abdominal:      General: Bowel sounds are normal       Palpations: Abdomen is soft  Tenderness: There is no abdominal tenderness  Musculoskeletal:      Right lower leg: No edema  Left lower leg: No edema  Skin:     General: Skin is warm and dry  Capillary Refill: Capillary refill takes less than 2 seconds  Neurological:      General: No focal deficit present  Mental Status: She is alert  Mental status is at baseline  Comments: Patient is nonverbal with dementia  Alert, and able to follow commands however was able to move all extremities   Psychiatric:         Attention and Perception: She is inattentive        Comments: Unable to assess as patient is nonverbal with dementia at baseline             Additional Data:     Lab Results: I have personally reviewed pertinent reports  Results from last 7 days   Lab Units 04/11/21  1501   WBC Thousand/uL 5 22   HEMOGLOBIN g/dL 13 1   HEMATOCRIT % 40 3   PLATELETS Thousands/uL 157   LYMPHO PCT % 18   MONO PCT % 5   EOS PCT % 0     Results from last 7 days   Lab Units 04/11/21  1500   POTASSIUM mmol/L 3 7   CHLORIDE mmol/L 105   CO2 mmol/L 29   BUN mg/dL 20   CREATININE mg/dL 1 18   CALCIUM mg/dL 7 7*   ALK PHOS U/L 183*   ALT U/L 67   AST U/L 57*     Results from last 7 days   Lab Units 04/11/21  1541   INR  1 02       Imaging: I have personally reviewed pertinent reports  Xr Chest 1 View Portable    Result Date: 4/11/2021  Narrative: CHEST INDICATION:   choking  COMPARISON:  None EXAM PERFORMED/VIEWS:  XR CHEST PORTABLE FINDINGS: Cardiomediastinal silhouette appears unremarkable  Lung markings are crowded secondary to low lung volumes  Within limitations of this examination there is no focal airspace opacity to suggest pneumonia  No pneumothorax or pleural effusion  Osseous structures appear within normal limits for patient age  Impression: No active pulmonary disease on examination which is somewhat limited secondary to low lung volumes  Workstation performed: [id number]       EKG, Pathology, and Other Studies Reviewed on Admission:   · EKG:  Normal sinus rhythm with no ischemic changes    Epic / Care Everywhere Records Reviewed: Yes     ** Please Note: This note has been constructed using a voice recognition system   **

## 2021-04-11 NOTE — ED NOTES
Pt transported to floor with RT and ED tech on cardiac monitor and 10L mid-flow O2       Yakov Champagne RN  04/11/21 7743

## 2021-04-11 NOTE — ASSESSMENT & PLAN NOTE
Likely secondary to choking/aspiration    On admission, satting 70% placed on high-flow which then titrated down to 6 L nasal     Plan:  · Continue oxygen supplements titrate to maintain SpO2 above 88%  · Hold on further antibiotics (received Unasyn in ED)  · Check procalcitonin  · Follow-up on chest x-ray  · Monitor fever curves and WBC count  · Aspiration precautions  · Respiratory protocol  · NPO until seen and evaluated by speech

## 2021-04-12 PROBLEM — R79.89 ELEVATED LACTIC ACID LEVEL: Status: RESOLVED | Noted: 2021-04-11 | Resolved: 2021-04-12

## 2021-04-12 PROBLEM — E87.2 RESPIRATORY ACIDOSIS: Status: RESOLVED | Noted: 2021-04-11 | Resolved: 2021-04-12

## 2021-04-12 PROBLEM — R71.8 ELEVATED MCV: Status: ACTIVE | Noted: 2021-04-12

## 2021-04-12 PROBLEM — E87.29 RESPIRATORY ACIDOSIS: Status: RESOLVED | Noted: 2021-04-11 | Resolved: 2021-04-12

## 2021-04-12 LAB
ANION GAP SERPL CALCULATED.3IONS-SCNC: 8 MMOL/L (ref 4–13)
BUN SERPL-MCNC: 13 MG/DL (ref 5–25)
CALCIUM SERPL-MCNC: 7.8 MG/DL (ref 8.3–10.1)
CHLORIDE SERPL-SCNC: 108 MMOL/L (ref 100–108)
CHOLEST SERPL-MCNC: 127 MG/DL (ref 50–200)
CO2 SERPL-SCNC: 28 MMOL/L (ref 21–32)
CREAT SERPL-MCNC: 1.08 MG/DL (ref 0.6–1.3)
ERYTHROCYTE [DISTWIDTH] IN BLOOD BY AUTOMATED COUNT: 14.2 % (ref 11.6–15.1)
EST. AVERAGE GLUCOSE BLD GHB EST-MCNC: 103 MG/DL
FOLATE SERPL-MCNC: 3.3 NG/ML (ref 3.1–17.5)
GFR SERPL CREATININE-BSD FRML MDRD: 61 ML/MIN/1.73SQ M
GLUCOSE SERPL-MCNC: 107 MG/DL (ref 65–140)
HBA1C MFR BLD: 5.2 %
HCT VFR BLD AUTO: 37.7 % (ref 34.8–46.1)
HDLC SERPL-MCNC: 51 MG/DL
HGB BLD-MCNC: 12.3 G/DL (ref 11.5–15.4)
LDLC SERPL CALC-MCNC: 60 MG/DL (ref 0–100)
MCH RBC QN AUTO: 34 PG (ref 26.8–34.3)
MCHC RBC AUTO-ENTMCNC: 32.6 G/DL (ref 31.4–37.4)
MCV RBC AUTO: 104 FL (ref 82–98)
NONHDLC SERPL-MCNC: 76 MG/DL
PLATELET # BLD AUTO: 153 THOUSANDS/UL (ref 149–390)
PMV BLD AUTO: 10.7 FL (ref 8.9–12.7)
POTASSIUM SERPL-SCNC: 3.7 MMOL/L (ref 3.5–5.3)
PROCALCITONIN SERPL-MCNC: <0.05 NG/ML
PROCALCITONIN SERPL-MCNC: <0.05 NG/ML
RBC # BLD AUTO: 3.62 MILLION/UL (ref 3.81–5.12)
SODIUM SERPL-SCNC: 144 MMOL/L (ref 136–145)
TRIGL SERPL-MCNC: 79 MG/DL
TROPONIN I SERPL-MCNC: 0.02 NG/ML
WBC # BLD AUTO: 9.83 THOUSAND/UL (ref 4.31–10.16)

## 2021-04-12 PROCEDURE — 80061 LIPID PANEL: CPT | Performed by: INTERNAL MEDICINE

## 2021-04-12 PROCEDURE — 82746 ASSAY OF FOLIC ACID SERUM: CPT | Performed by: INTERNAL MEDICINE

## 2021-04-12 PROCEDURE — 80048 BASIC METABOLIC PNL TOTAL CA: CPT | Performed by: INTERNAL MEDICINE

## 2021-04-12 PROCEDURE — 92610 EVALUATE SWALLOWING FUNCTION: CPT

## 2021-04-12 PROCEDURE — 99232 SBSQ HOSP IP/OBS MODERATE 35: CPT | Performed by: HOSPITALIST

## 2021-04-12 PROCEDURE — 84145 PROCALCITONIN (PCT): CPT | Performed by: EMERGENCY MEDICINE

## 2021-04-12 PROCEDURE — 85027 COMPLETE CBC AUTOMATED: CPT | Performed by: INTERNAL MEDICINE

## 2021-04-12 PROCEDURE — 84484 ASSAY OF TROPONIN QUANT: CPT | Performed by: INTERNAL MEDICINE

## 2021-04-12 PROCEDURE — 83036 HEMOGLOBIN GLYCOSYLATED A1C: CPT | Performed by: INTERNAL MEDICINE

## 2021-04-12 RX ORDER — ECHINACEA PURPUREA EXTRACT 125 MG
1 TABLET ORAL
Status: DISCONTINUED | OUTPATIENT
Start: 2021-04-12 | End: 2021-04-18 | Stop reason: HOSPADM

## 2021-04-12 RX ADMIN — QUETIAPINE FUMARATE 25 MG: 25 TABLET ORAL at 12:34

## 2021-04-12 RX ADMIN — ESCITALOPRAM OXALATE 20 MG: 20 TABLET ORAL at 22:06

## 2021-04-12 RX ADMIN — DONEPEZIL HYDROCHLORIDE 5 MG: 5 TABLET, FILM COATED ORAL at 22:06

## 2021-04-12 RX ADMIN — OLANZAPINE 5 MG: 2.5 TABLET, FILM COATED ORAL at 15:53

## 2021-04-12 RX ADMIN — ESCITALOPRAM OXALATE 10 MG: 10 TABLET ORAL at 10:09

## 2021-04-12 RX ADMIN — CLONAZEPAM 1 MG: 1 TABLET ORAL at 17:53

## 2021-04-12 RX ADMIN — SALINE NASAL SPRAY 1 SPRAY: 1.5 SOLUTION NASAL at 14:35

## 2021-04-12 RX ADMIN — OLANZAPINE 10 MG: 10 TABLET, FILM COATED ORAL at 22:06

## 2021-04-12 RX ADMIN — QUETIAPINE FUMARATE 50 MG: 25 TABLET ORAL at 22:06

## 2021-04-12 RX ADMIN — SODIUM CHLORIDE 75 ML/HR: 0.9 INJECTION, SOLUTION INTRAVENOUS at 18:14

## 2021-04-12 RX ADMIN — OLANZAPINE 5 MG: 2.5 TABLET, FILM COATED ORAL at 10:09

## 2021-04-12 RX ADMIN — Medication 1 TABLET: at 12:33

## 2021-04-12 RX ADMIN — CLONAZEPAM 1 MG: 1 TABLET ORAL at 10:09

## 2021-04-12 NOTE — ASSESSMENT & PLAN NOTE
Reported loss of pulses per EMS likely secondary to choking/aspiration  CPR was initiated and ROSC was achieved (assuming within 5 minutes)  · Continue oxygen supplements and titrate O2 to maintain SpO2 above 88%  · Patient is currently alert and at baseline, no complaints from mother at bedside as well     · Continuous pulse oximetry

## 2021-04-12 NOTE — RESPIRATORY THERAPY NOTE
RT Protocol Note  Shaq Flores 48 y o  female MRN: [id number]  Unit/Bed#: S -01 Encounter: [id number]    Assessment    Principal Problem:    Acute respiratory failure with hypoxia (HCC)  Active Problems:    Down syndrome    Dementia (HCC)    Anxiety    Cardiac arrest (HCC)    Elevated lactic acid level    Respiratory acidosis      Home Pulmonary Medications:  N/A       Past Medical History:   Diagnosis Date    Anxiety     Dementia ([address] )     Depression     Down syndrome      Social History     Socioeconomic History    Marital status: Single     Spouse name: None    Number of children: None    Years of education: None    Highest education level: None   Occupational History    None   Social Needs    Financial resource strain: None    Food insecurity     Worry: None     Inability: None    Transportation needs     Medical: None     Non-medical: None   Tobacco Use    Smoking status: Never Smoker    Smokeless tobacco: Never Used   Substance and Sexual Activity    Alcohol use: Never     Frequency: Never    Drug use: Never    Sexual activity: Never   Lifestyle    Physical activity     Days per week: None     Minutes per session: None    Stress: None   Relationships    Social connections     Talks on phone: None     Gets together: None     Attends Judaism service: None     Active member of club or organization: None     Attends meetings of clubs or organizations: None     Relationship status: None    Intimate partner violence     Fear of current or ex partner: None     Emotionally abused: None     Physically abused: None     Forced sexual activity: None   Other Topics Concern    None   Social History Narrative    None       Subjective         Objective    Physical Exam:   Assessment Type: Assess only  General Appearance: Alert, Awake  Respiratory Pattern: Dyspnea with exertion  Chest Assessment: Chest expansion symmetrical  Bilateral Breath Sounds: Clear, Diminished  Cough: None  O2 Device: (P) 5L NC    Vitals:  Blood pressure 131/65, pulse 88, temperature 97 5 °F (36 4 °C), temperature source Axillary, resp  rate 16, height 4' 6" (1 372 m), weight 58 kg (127 lb 13 9 oz), SpO2 96 %  Imaging and other studies: I have personally reviewed pertinent reports  O2 Device: (P) 5L NC     Plan         Resp Comments: (P) Pt is assessed per protocol; BS are decreased and clear, spo2 96 on 5L NC  Pt has no home pulmonary regimen  Pt is a s/p cardiac arrest post hemilich maneuver after choking on a peice of pie  pt is resting comfortably and will continue to be monitored and re-assessed as needed

## 2021-04-12 NOTE — ASSESSMENT & PLAN NOTE
Likely secondary to choking/aspiration  On admission, satting 70% placed on high-flow which then titrated down to 6 L nasal  CXR unremarkable  Abx held, procalc x1 negative  Plan:  · Continue oxygen supplements titrate to maintain SpO2 above 88% -- currently sat  High 90s on 6L  Discussed with nursing team to further wean  · Low suspicion for aspiration pna  Continue holding Abx , Procalcitonin reflex pending for tomorrow  · Monitor fever curves and WBC count  · Aspiration precautions, respiratory protocol  · NPO until seen and evaluated by speech -- requires soft and gluten-free diet once able to start  Mother at bedside vocalized this

## 2021-04-12 NOTE — DISCHARGE SUMMARY
[facility]  Discharge- Radha Ar 7/1/1974, 48 y o  female MRN: [id number]  Unit/Bed#: S -01 Encounter: [id number]  Primary Care Provider: Radha Kendrick MD   Date and time admitted to hospital: 4/11/2021  2:19 PM    * Acute respiratory failure with hypoxia [facility])  Assessment & Plan  Persistent- Likely secondary to choking/aspiration  On admission, satting 70% placed on high-flow which then titrated down to 6 L nasal  CXR unremarkable  Abx held, procalc x1 negative  CXR 04/14 - Mild bibasilar opacity, greater on the left  Lasix 20mg IV one time (continuous fluids and aggressive resuscitation on admission) on 04/14  No signs or labs indication infection/pna  Patient was out of bed for a fair amount of time, walked by nursing yesterday and remained saturating at 95% with 2L  Overnight there are documented sats of 88 and 89, however she is down to room air with help of nursing team    Plan:  · Low suspicion for aspiration pna, no Abx given  · Ocean spray for congestion prn, Incentive spirometer encouraged to taken home and practiced  · Aspiration precautions discussed with mother previously  dysphagia diet 2 diet  Elevated MCV  Assessment & Plan  Considering history of down's could be 2/2 thyroid dysfunction however will not obtain at the moment considering cardiac arrest  To be considered by PCP in outpatient setting  Folate noted to be low normal at 3 3 -- continue multivitamin    Anxiety  Assessment & Plan  Continue home medications of Lexapro and Ativan p r n      Dementia ([facility] [place] [address] )  Assessment & Plan  Continue donepezil, Zyprexa and Seroquel home medication      Down syndrome  Assessment & Plan  Continue supportive care as done at home      Discharging Resident Physician: Alonzo Crow MD  Attending: Fausto Michelle MD  PCP: Radha Kendrick MD  Admission Date: 4/11/2021  Discharge Date: 04/18/21    Disposition:     Home    Reason for Admission: Cardiac arrest with ROSC, acute hypoxic respiratory failure    Consultations During Hospital Stay:  · none    Procedures Performed:     · none    Significant Findings / Test Results:     Xr Chest Portable - Result Date: 4/14/2021 - Impression: Mild bibasilar opacity, greater on the left, which could be due to aspiration  Xr Chest 1 View Portable - Result Date: 4/11/2021 - Impression: No active pulmonary disease on examination which is somewhat limited secondary to low lung volumes  Incidental Findings:   · Folate 3 3    Test Results Pending at Discharge (will require follow up):   · none     Outpatient Tests Requested:  · none    Complications:  none    Hospital Course:     Barbara Antoine is a 48 y o  female patient who originally presented to the hospital on 4/11/2021 due to cardiac arrest  Ate cherry pie unsupervised and choked on it, lost consciousness  EMS was called, she was noted to have respiratory failure  Heimlich attempted but then pulse was not detected hence team started CPR, with ROSC (unknown time)  Was brought to the ER required high-flow oxygen on admission, transitioned quickly to NC 6L,  more alert and back to baseline which is nonverbal  CXR unremarkable  Received unasyn in ED, holding Abx for procalc   NPO until seen and evaluated by speech  Respiratory and aspiration precautions  Along with lactic acidosis that cleared with fluids  Troponin peaked at  06 likely 2/2 hypoxia  CBC unremarkable, with MCV of 104  Oxygen weaned off  She was walked by nursing and O2 sats remained >88% so patient will not require oxygen upon discharge  Speech team evaluated her - dysphagia diet 2  Mother aware of aspiration precautions  Patient will be discharged with multivitamin for low-normal folate  PCP follow up in one week  A1c and Lipid panel collected as mother notes family history of DM, unremarkable  Patient was stable for discharge        Condition at Discharge: good     Discharge Day Visit / Exam: Subjective:  No overnight events or complaints this morning  Patient remained on room air with adequate saturation >88%  Vitals: Blood Pressure: (!) 89/53(notified rn) (04/18/21 0716)  Pulse: 64 (04/18/21 0716)  Temperature: (!) 96 5 °F (35 8 °C)(notified rn) (04/18/21 0716)  Temp Source: Axillary (04/18/21 0716)  Respirations: 16 (04/18/21 0716)  Height: 4' 6" (137 2 cm) (04/11/21 1930)  Weight - Scale: 55 6 kg (122 lb 9 2 oz) (04/18/21 0600)  SpO2: (!) 88 %(notified rn) (04/18/21 0716)  Exam:   Physical Exam  Vitals signs and nursing note reviewed  Constitutional:       Appearance: She is not ill-appearing  Cardiovascular:      Rate and Rhythm: Normal rate and regular rhythm  Pulses: Normal pulses  Heart sounds: Normal heart sounds  Pulmonary:      Effort: Pulmonary effort is normal       Breath sounds: Normal breath sounds  Abdominal:      General: Bowel sounds are normal  There is no distension  Palpations: Abdomen is soft  Tenderness: There is no abdominal tenderness  Musculoskeletal:      Right lower leg: No edema  Left lower leg: No edema  Skin:     General: Skin is warm and dry  Neurological:      General: No focal deficit present  Mental Status: She is alert  Mental status is at baseline  Psychiatric:         Mood and Affect: Mood normal          Behavior: Behavior normal        Discussion with Family: mother at bedside    Discharge instructions/Information to patient and family:   See after visit summary for information provided to patient and family  Provisions for Follow-Up Care:  See after visit summary for information related to follow-up care and any pertinent home health orders  Planned Readmission: no     Discharge Medications:  See after visit summary for reconciled discharge medications provided to patient and family        ** Please Note: This note has been constructed using a voice recognition system **

## 2021-04-12 NOTE — DISCHARGE INSTR - AVS FIRST PAGE
Dear Lia Go,     It was our pleasure to care for you here at [facility]  It is our hope that we were always able to exceed the expected standards for your care during your stay  You were hospitalized due to cardiac arrest   You were cared for on the 4th floor by Randee Pradhan MD under the service of Donnie French MD with the [facility]ist [facility] who covers for your primary care physician (PCP), Tang Rg MD, while you were hospitalized  If you have any questions or concerns related to this hospitalization, you may contact us at [phone]  For follow up as well as any medication refills, we recommend that you follow up with your primary care physician  A registered nurse will reach out to you by phone within a few days after your discharge to answer any additional questions that you may have after going home  However, at this time we provide for you here, the most important instructions / recommendations at discharge:     · Notable Medication Adjustments -   · Ocean spray for nasal congestion  · Take multivitamin  · Testing Required after Discharge -   · none  · Important follow up information -   · PCP in one week  · Other Instructions -   · Monitor diet and intake to avoid aspiration  · Continue to use incentive spirometry at home  · Please review this entire after visit summary as additional general instructions including medication list, appointments, activity, diet, any pertinent wound care, and other additional recommendations from your care team that may be provided for you        Sincerely,     Randee Pradhan MD

## 2021-04-12 NOTE — SPEECH THERAPY NOTE
Speech-Language Pathology Bedside Swallow Evaluation        Patient Name: Eliel Borrego    TCGHK'M Date: 4/12/2021     Problem List  Principal Problem:    Acute respiratory failure with hypoxia (HCC)  Active Problems:    Down syndrome    Dementia (HCC)    Anxiety    Cardiac arrest ([facility] )    Elevated MCV         Past Medical History  Past Medical History:   Diagnosis Date    Anxiety     Dementia ([facility] )     Depression     Down syndrome        Past Surgical History  History reviewed  No pertinent surgical history  Summary    Pt presents with normal oral and pharyngeal swallowing, given limited consistencies but has impulsive self feeding behaviors that may increase her risk of choking/aspiration  Per pt family's report, pt eats soft, chopped foods at baseline  No s/s of aspiration noted during evaluation  Recommendations:   Diet: mechanically altered/level 2 diet   Meds: whole with liquid - pt may chew pills  Frequent Oral care: 2x/day  Aspiration precautions and compensatory swallowing strategies: upright posture, only feed when fully alert, slow rate of feeding and small bites/sips  Other Recommendations/ considerations: Will continue to follow for diet tolerance  Pt also noted to be gluten free and have some lactose sensitivities per family  Current Medical Status  Pt is a 48 y o  female who presented to [address] [place] [address]  with aspiration event with resultant respiratory failure and cardiac arrest requiring CPR in the field  Pt has a past medical history significant for Down syndrome, dementia, anxiety  Earlier today patient choked on cherry pie and lost consciousness  By EMS report, Heimlich maneuver was performed but patient was noted not to have pulse therefore CPR was initiated and Rosc was achieved    Patient was placed on high-flow nasal cannula which then titrated down to 6 L nasal cannula with stable and well saturation      Patient became more alert and back to baseline which is nonverbal   Therefore history was not able to obtain point patient and was mainly obtained from her brother and stepmother  Reported that patient usually eats without difficulty and swallowing and denied any choking or coughing while eating or taking medications however it is always supervised accept this time when she had the cherry pie without observation  Patient's family denies any complaints from patient prior to the event  Her baseline is nonverbal pleasant however gets agitated and anxious if not around family  Patient is not very active however she can understand basic commands from family only      Patient was evaluated by critical care team who recommended floor admission  When examined, patient is alert however none verbal   She does not look in distress  She was saturating well with no evidence of labored breathing on 6 L nasal cannula  She was able to move all of her extremities however she was not able to follow any commands  I discussed with hospital supervisor who agreed to keep 1 family member with the patient given her baseline  Per pt's family, pt eats soft foods cut up, takes pills whole w/ liquids w/o difficulty, and does not typically choke/cough w/ po       Past medical history:   Please see H&P for details    Special Studies:  CXR 04/11/2021: No active pulmonary disease on examination which is somewhat limited secondary to low lung volumes        Social/Education/Vocational Hx:  Pt lives with family    Swallow Information   Current Risks for Dysphagia & Aspiration: aspiration event, change in respiratory status  Current Symptoms/Concerns: choking incident and change in respiratory status  Current Diet: NPO   Baseline Diet: soft/level 3 diet and thin liquids- pt's stepmother states pt eats softer foods cut up and does not tend to eat harder solids  Takes pills- whole w/ thin liquid    Baseline Assessment   Behavior/Cognition: alert  Speech/Language Status: able to follow commands inconsistently and no verbal output noted  Patient Positioning: upright in bed     Swallow Mechanism Exam   Facial: symmetrical  Labial: unable to test 2/2 limited command following  Lingual: unable to test 2/2 limited command following  Velum: unable to visualize  Mandible: adequate ROM  Dentition: adequate  Vocal quality:pt is nonverbal   Volitional Cough: unable to initiate volitional cough   Respiratory: NC- RN was weaning pt to lower O2 flow during evaluation    Consistencies Assessed and Performance   Consistencies Administered: thin liquids by cup and straw, nectar thick by cup and straw, puree and mechanical soft solids (banana cut up), small pills whole w/ thin liquid    Oral Stage: Oral care was performed  Pt was able to feed self  Pt had adequate bolus retrieval from tsp and cup, and was able to suck from a straw  Pt was impulsive w/ drinking  Per pt's stepmother's report, pt would be able to bite banana, but takes very large bites so she tends to cut them up  Pt had quick mastication, manipulation, and transfer of banana  Pt had timely oral processing of puree and appeared w/ good oral control of thick and thin liquids  Pt noted to minimally masticate pills, which stepmother reports is typical  Pt was able to transfer pills w/ thin liquid  No oral residue noted  Pharyngeal Stage: Swallow initiation appeared timely and complete  Pt's O2 remained between 88-92, and RN was lowering O2 during evaluation  ?decreased coordination breathing w/ po vs change in respiratory status  No coughing or choking noted         Esophageal Concerns: none reported      Results Reviewed with: RN, MD and family   Dysphagia Goals: pt will tolerate dysphagia 2 with thin liquids without s/s of aspiration x72 hours

## 2021-04-12 NOTE — PLAN OF CARE
Problem: Potential for Falls  Goal: Patient will remain free of falls  Description: INTERVENTIONS:  - Assess patient frequently for physical needs  -  Identify cognitive and physical deficits and behaviors that affect risk of falls    -  [place] fall precautions as indicated by assessment   - Educate patient/family on patient safety including physical limitations  - Instruct patient to call for assistance with activity based on assessment  - Modify environment to reduce risk of injury  - Consider OT/PT consult to assist with strengthening/mobility  Outcome: Progressing     Problem: Prexisting or High Potential for Compromised Skin Integrity  Goal: Skin integrity is maintained or improved  Description: INTERVENTIONS:  - Identify patients at risk for skin breakdown  - Assess and monitor skin integrity  - Assess and monitor nutrition and hydration status  - Monitor labs   - Assess for incontinence   - Turn and reposition patient  - Assist with mobility/ambulation  - Relieve pressure over bony prominences  - Avoid friction and shearing  - Provide appropriate hygiene as needed including keeping skin clean and dry  - Evaluate need for skin moisturizer/barrier cream  - Collaborate with interdisciplinary team   - Patient/family teaching  - Consider wound care consult   Outcome: Progressing

## 2021-04-12 NOTE — ASSESSMENT & PLAN NOTE
Considering history of down's could be 2/2 thyroid dysfunction however will not obtain at the moment considering cardiac arrest  To be considered by PCP in outpatient setting  Folate noted to be low normal at 3 3 -- will start on multivitamin

## 2021-04-12 NOTE — PROGRESS NOTES
[facility]  Progress Note - Tong Peraza 7/1/1974, 48 y o  female MRN: [id number]  Unit/Bed#: S -01 Encounter: [id number]  Primary Care Provider: Jaret Lee MD   Date and time admitted to hospital: 4/11/2021  2:19 PM    * Acute respiratory failure with hypoxia [facility])  Assessment & Plan  Likely secondary to choking/aspiration  On admission, satting 70% placed on high-flow which then titrated down to 6 L nasal  CXR unremarkable  Abx held, procalc x1 negative  Plan:  · Continue oxygen supplements titrate to maintain SpO2 above 88% -- currently sat  High 90s on 6L  Discussed with nursing team to further wean  · Low suspicion for aspiration pna  Continue holding Abx , Procalcitonin reflex pending for tomorrow  · Monitor fever curves and WBC count  · Aspiration precautions, respiratory protocol  · NPO until seen and evaluated by speech -- requires soft and gluten-free diet once able to start  Mother at bedside vocalized this  Cardiac arrest [facility])  Assessment & Plan  Reported loss of pulses per EMS likely secondary to choking/aspiration  CPR was initiated and ROSC was achieved (assuming within 5 minutes)  · Continue oxygen supplements and titrate O2 to maintain SpO2 above 88%  · Patient is currently alert and at baseline, no complaints from mother at bedside as well  · Continuous pulse oximetry      Respiratory acidosis-resolved as of 4/12/2021  Assessment & Plan  Evidence by ABG on admission, likely secondary to choking/aspiration  Repeat ABG with normal pH  Resolved    · Continue to monitor respiratory status    Anxiety  Assessment & Plan  Continue home medications of Lexapro and Ativan p r n  Dementia ([facility] [address] )  Assessment & Plan  Continue donepezil, Zyprexa and Seroquel home medication  Supportive care    Down syndrome  Assessment & Plan  Continue supportive care      Elevated lactic acid level-resolved as of 4/12/2021  Assessment & Plan  Likely secondary to hypoxia  · Resolved with IVF          VTE Pharmacologic Prophylaxis:   Pharmacologic: low VTE  Mechanical VTE Prophylaxis in Place: No    Discussions with Specialists or Other Care Team Provider: Nursing    Education and Discussions with Family / Patient: assessment and plan discussed with mother at bedside  Current Length of Stay: 1 day(s)    Current Patient Status: Inpatient     Discharge Plan / Estimated Discharge Date: 24-48 hours, weaning oxygen and speech eval pending  Code Status: Level 1 - Full Code      Subjective:   No complaints from mother other than agitation -- patient trying to pull off her nasal canula  On restraints  No pain as per mother either, she usually will point this out to her mother but hasn't done so  Bowel and bladder habits unaltered  Objective:     Vitals:   Temp (24hrs), Av 3 °F (36 3 °C), Min:95 5 °F (35 3 °C), Max:98 1 °F (36 7 °C)    Temp:  [95 5 °F (35 3 °C)-98 1 °F (36 7 °C)] 98 1 °F (36 7 °C)  HR:  [50-88] 76  Resp:  [16-22] 22  BP: (101-142)/(51-67) 142/65  SpO2:  [77 %-100 %] 98 %  Body mass index is 30 83 kg/m²  Input and Output Summary (last 24 hours):     No intake or output data in the 24 hours ending 21 0849    Physical Exam:     Physical Exam  Vitals signs and nursing note reviewed  Constitutional:       Appearance: Normal appearance  Cardiovascular:      Rate and Rhythm: Normal rate and regular rhythm  Pulses: Normal pulses  Heart sounds: Normal heart sounds  Pulmonary:      Effort: Pulmonary effort is normal       Breath sounds: Normal breath sounds  Abdominal:      General: Bowel sounds are normal  There is no distension  Palpations: Abdomen is soft  Tenderness: There is no abdominal tenderness  Musculoskeletal:      Right lower leg: No edema  Left lower leg: No edema  Skin:     General: Skin is warm and dry  Neurological:      General: No focal deficit present  Mental Status: She is alert   Mental status is at baseline  Psychiatric:         Behavior: Behavior normal       Comments: Nonverbal at baseline         Additional Data:     Labs:    Results from last 7 days   Lab Units 04/12/21  0439 04/11/21  1501   WBC Thousand/uL 9 83 5 22   HEMOGLOBIN g/dL 12 3 13 1   HEMATOCRIT % 37 7 40 3   PLATELETS Thousands/uL 153 157   LYMPHO PCT %  --  18   MONO PCT %  --  5   EOS PCT %  --  0     Results from last 7 days   Lab Units 04/12/21  0439 04/11/21  1500   POTASSIUM mmol/L 3 7 3 7   CHLORIDE mmol/L 108 105   CO2 mmol/L 28 29   BUN mg/dL 13 20   CREATININE mg/dL 1 08 1 18   CALCIUM mg/dL 7 8* 7 7*   ALK PHOS U/L  --  183*   ALT U/L  --  67   AST U/L  --  57*     Results from last 7 days   Lab Units 04/11/21  1541   INR  1 02       * I Have Reviewed All Lab Data Listed Above  * Additional Pertinent Lab Tests Reviewed: Samuel [facility] Admission Reviewed    Imaging:    Imaging Reports Reviewed Today Include: CXR  Imaging Personally Reviewed by Myself Includes:  none    Recent Cultures (last 7 days):     Results from last 7 days   Lab Units 04/11/21  1536 04/11/21  1501   BLOOD CULTURE  Received in Microbiology Lab  Culture in Progress  Received in Microbiology Lab  Culture in Progress         Last 24 Hours Medication List:   Current Facility-Administered Medications   Medication Dose Route Frequency Provider Last Rate    clonazePAM  1 mg Oral BID Tu Ernst MD      donepezil  5 mg Oral HS Tu Ernst MD      escitalopram  10 mg Oral Daily Tu Ernst MD      escitalopram  20 mg Oral HS Tu Ernst MD      LORazepam  1 mg Oral Q8H PRN Tu Ernst MD      OLANZapine  10 mg Oral HS Tu Ernst MD      OLANZapine  5 mg Oral BID Tu Ernst MD      QUEtiapine  25 mg Oral Daily Tu Ernst MD      QUEtiapine  50 mg Oral HS Tu Ernst MD      sodium chloride  75 mL/hr Intravenous Continuous Tu Ernst MD 75 mL/hr (04/11/21 2009)        Today, Patient Was Seen By: Pantera aMnzanares MD    ** Please Note: This note has been constructed using a voice recognition system   **

## 2021-04-12 NOTE — PLAN OF CARE

## 2021-04-12 NOTE — PLAN OF CARE
Upgrade to dysphagia 2 diet and thin liquids  Meds whole w/ thin liquids, pt may chew pills  Aspiration precautions  Reminders to chew well and eat slowly  Assistance/supervision during meals

## 2021-04-12 NOTE — ASSESSMENT & PLAN NOTE
Likely secondary to choking/aspiration  On admission, satting 70% placed on high-flow which then titrated down to 6 L nasal  CXR unremarkable  Abx held, procalc x1 negative  No chest tenderness to note inhibitory pain  Lungs clear  Does have stuffy nose, requiring 5L oxygen from 2L  Plan:  · Maintain o2 >88%, wean oxygen  · Ocean spray for congestion  · Low suspicion for aspiration pna, no Abx  , will discuss repeat CXR with hydration however procal negative  · Aspiration precautions encouraged for home, mother aware  · Resume dysphagia diet 2, gluten free as requested by mother   Will place simethicone

## 2021-04-12 NOTE — ASSESSMENT & PLAN NOTE
in the setting of Cardiac Arrest a/e/b change in mental status, unresponsiveness and agitation treated with CPR, O2, VS monitoring, fall precautions, restraints and reorientation   Findings: 4/11 ED- " Patient went unresponsive at home shortly after choking  When EMS arrived at the scene they performed the Heimlich maneuver  She lost pulses so CPR was initiated  After a couple of rounds of CPR she had ROSC   EMS had difficulty oxygenating her EN route to the emergency department due to agitation

## 2021-04-13 ENCOUNTER — APPOINTMENT (INPATIENT)
Dept: RADIOLOGY | Facility: HOSPITAL | Age: 46
DRG: 189 | End: 2021-04-13
Payer: MEDICARE

## 2021-04-13 LAB
ANION GAP SERPL CALCULATED.3IONS-SCNC: 7 MMOL/L (ref 4–13)
BUN SERPL-MCNC: 10 MG/DL (ref 5–25)
CALCIUM SERPL-MCNC: 7.5 MG/DL (ref 8.3–10.1)
CHLORIDE SERPL-SCNC: 111 MMOL/L (ref 100–108)
CO2 SERPL-SCNC: 26 MMOL/L (ref 21–32)
CREAT SERPL-MCNC: 0.97 MG/DL (ref 0.6–1.3)
ERYTHROCYTE [DISTWIDTH] IN BLOOD BY AUTOMATED COUNT: 14.5 % (ref 11.6–15.1)
GFR SERPL CREATININE-BSD FRML MDRD: 69 ML/MIN/1.73SQ M
GLUCOSE SERPL-MCNC: 88 MG/DL (ref 65–140)
HCT VFR BLD AUTO: 37.7 % (ref 34.8–46.1)
HGB BLD-MCNC: 11.8 G/DL (ref 11.5–15.4)
MCH RBC QN AUTO: 33.1 PG (ref 26.8–34.3)
MCHC RBC AUTO-ENTMCNC: 31.3 G/DL (ref 31.4–37.4)
MCV RBC AUTO: 106 FL (ref 82–98)
PLATELET # BLD AUTO: 150 THOUSANDS/UL (ref 149–390)
PMV BLD AUTO: 10.9 FL (ref 8.9–12.7)
POTASSIUM SERPL-SCNC: 3.6 MMOL/L (ref 3.5–5.3)
RBC # BLD AUTO: 3.56 MILLION/UL (ref 3.81–5.12)
SODIUM SERPL-SCNC: 144 MMOL/L (ref 136–145)
WBC # BLD AUTO: 7.21 THOUSAND/UL (ref 4.31–10.16)

## 2021-04-13 PROCEDURE — 80048 BASIC METABOLIC PNL TOTAL CA: CPT | Performed by: INTERNAL MEDICINE

## 2021-04-13 PROCEDURE — 99232 SBSQ HOSP IP/OBS MODERATE 35: CPT | Performed by: INTERNAL MEDICINE

## 2021-04-13 PROCEDURE — 85027 COMPLETE CBC AUTOMATED: CPT | Performed by: INTERNAL MEDICINE

## 2021-04-13 PROCEDURE — 71045 X-RAY EXAM CHEST 1 VIEW: CPT

## 2021-04-13 RX ORDER — SIMETHICONE 80 MG
80 TABLET,CHEWABLE ORAL EVERY 6 HOURS PRN
Status: DISCONTINUED | OUTPATIENT
Start: 2021-04-13 | End: 2021-04-18 | Stop reason: HOSPADM

## 2021-04-13 RX ADMIN — Medication 1 TABLET: at 09:26

## 2021-04-13 RX ADMIN — OLANZAPINE 5 MG: 2.5 TABLET, FILM COATED ORAL at 17:42

## 2021-04-13 RX ADMIN — QUETIAPINE FUMARATE 50 MG: 25 TABLET ORAL at 21:25

## 2021-04-13 RX ADMIN — QUETIAPINE FUMARATE 25 MG: 25 TABLET ORAL at 11:11

## 2021-04-13 RX ADMIN — OLANZAPINE 10 MG: 10 TABLET, FILM COATED ORAL at 21:25

## 2021-04-13 RX ADMIN — SODIUM CHLORIDE 75 ML/HR: 0.9 INJECTION, SOLUTION INTRAVENOUS at 07:03

## 2021-04-13 RX ADMIN — SALINE NASAL SPRAY 1 SPRAY: 1.5 SOLUTION NASAL at 09:26

## 2021-04-13 RX ADMIN — CLONAZEPAM 1 MG: 1 TABLET ORAL at 17:42

## 2021-04-13 RX ADMIN — SIMETHICONE 80 MG: 80 TABLET, CHEWABLE ORAL at 20:20

## 2021-04-13 RX ADMIN — ESCITALOPRAM OXALATE 20 MG: 20 TABLET ORAL at 21:25

## 2021-04-13 RX ADMIN — DONEPEZIL HYDROCHLORIDE 5 MG: 5 TABLET, FILM COATED ORAL at 21:25

## 2021-04-13 RX ADMIN — SIMETHICONE 80 MG: 80 TABLET, CHEWABLE ORAL at 09:26

## 2021-04-13 RX ADMIN — OLANZAPINE 5 MG: 2.5 TABLET, FILM COATED ORAL at 09:26

## 2021-04-13 RX ADMIN — ESCITALOPRAM OXALATE 10 MG: 10 TABLET ORAL at 09:26

## 2021-04-13 RX ADMIN — SODIUM CHLORIDE 75 ML/HR: 0.9 INJECTION, SOLUTION INTRAVENOUS at 21:24

## 2021-04-13 RX ADMIN — CLONAZEPAM 1 MG: 1 TABLET ORAL at 09:27

## 2021-04-13 NOTE — PROGRESS NOTES
[facility]  Progress Note - Rehana Dust 7/1/1974, 48 y o  female MRN: [id number]  Unit/Bed#: S -01 Encounter: [id number]  Primary Care Provider: Brice Litten, MD   Date and time admitted to hospital: 4/11/2021  2:19 PM    * Acute respiratory failure with hypoxia [facility])  Assessment & Plan  Likely secondary to choking/aspiration  On admission, satting 70% placed on high-flow which then titrated down to 6 L nasal  CXR unremarkable  Abx held, procalc x1 negative  No chest tenderness to note inhibitory pain  Lungs clear  Does have stuffy nose, requiring 5L oxygen from 2L  Plan:  · Maintain o2 >88%, wean oxygen  · Ocean spray for congestion  · Low suspicion for aspiration pna, no Abx  , will discuss repeat CXR with hydration however procal negative  · Aspiration precautions encouraged for home, mother aware  · Resume dysphagia diet 2, gluten free as requested by mother  Will place simethicone    Cardiac arrest with Acute anoxic encephalopathy  Assessment & Plan  in the setting of Cardiac Arrest a/e/b change in mental status, unresponsiveness and agitation treated with CPR, O2, VS monitoring, fall precautions, restraints and reorientation   Findings: 4/11 ED- " Patient went unresponsive at home shortly after choking  When EMS arrived at the scene they performed the Heimlich maneuver  She lost pulses so CPR was initiated  After a couple of rounds of CPR she had ROSC   EMS had difficulty oxygenating her EN route to the emergency department due to agitation  Elevated MCV  Assessment & Plan  Considering history of down's could be 2/2 thyroid dysfunction however will not obtain at the moment considering cardiac arrest  To be considered by PCP in outpatient setting  Folate noted to be low normal at 3 3 -- will start on multivitamin    Anxiety  Assessment & Plan  Continue home medications of Lexapro and Ativan p r n      Dementia [facility])  Assessment & Plan  Continue donepezil, Zyprexa and Seroquel home medication  Supportive care    Down syndrome  Assessment & Plan  Continue supportive care  VTE Pharmacologic Prophylaxis:   Pharmacologic: low VTE  Mechanical VTE Prophylaxis in Place: No    Discussions with Specialists or Other Care Team Provider: nursing    Education and Discussions with Family / Patient: assessment and plan discussed with mother at bedside  Current Length of Stay: 2 day(s)    Current Patient Status: Inpatient     Discharge Plan / Estimated Discharge Date: need to wean O2    Code Status: Level 1 - Full Code      Subjective: Mother presenting no complaints other than stuffy nose and gassy belly  No BM for 2 days now  Objective:     Vitals:   Temp (24hrs), Av 2 °F (36 8 °C), Min:98 1 °F (36 7 °C), Max:98 3 °F (36 8 °C)    Temp:  [98 1 °F (36 7 °C)-98 3 °F (36 8 °C)] 98 1 °F (36 7 °C)  HR:  [66-77] 66  Resp:  [18] 18  BP: (109-117)/(56-66) 109/66  SpO2:  [91 %-94 %] 91 %  Body mass index is 30 83 kg/m²  Input and Output Summary (last 24 hours): Intake/Output Summary (Last 24 hours) at 2021 0850  Last data filed at 2021 0703  Gross per 24 hour   Intake 1000 ml   Output --   Net 1000 ml       Physical Exam:     Physical Exam  Vitals signs and nursing note reviewed  Constitutional:       Appearance: She is not ill-appearing  Cardiovascular:      Rate and Rhythm: Normal rate and regular rhythm  Pulses: Normal pulses  Heart sounds: Normal heart sounds  Pulmonary:      Effort: Pulmonary effort is normal       Breath sounds: Normal breath sounds  Abdominal:      General: Bowel sounds are normal       Palpations: Abdomen is soft  Musculoskeletal:      Right lower leg: No edema  Left lower leg: No edema  Skin:     General: Skin is warm and dry  Neurological:      General: No focal deficit present  Mental Status: She is alert  Mental status is at baseline     Psychiatric:         Behavior: Behavior normal  Comments: Nonverbal at baseline           Additional Data:     Labs:    Results from last 7 days   Lab Units 04/13/21  0502  04/11/21  1501   WBC Thousand/uL 7 21   < > 5 22   HEMOGLOBIN g/dL 11 8   < > 13 1   HEMATOCRIT % 37 7   < > 40 3   PLATELETS Thousands/uL 150   < > 157   LYMPHO PCT %  --   --  18   MONO PCT %  --   --  5   EOS PCT %  --   --  0    < > = values in this interval not displayed  Results from last 7 days   Lab Units 04/13/21  0502  04/11/21  1500   POTASSIUM mmol/L 3 6   < > 3 7   CHLORIDE mmol/L 111*   < > 105   CO2 mmol/L 26   < > 29   BUN mg/dL 10   < > 20   CREATININE mg/dL 0 97   < > 1 18   CALCIUM mg/dL 7 5*   < > 7 7*   ALK PHOS U/L  --   --  183*   ALT U/L  --   --  67   AST U/L  --   --  57*    < > = values in this interval not displayed  Results from last 7 days   Lab Units 04/11/21  1541   INR  1 02       * I Have Reviewed All Lab Data Listed Above  * Additional Pertinent Lab Tests Reviewed: Samuel [facility] Admission Reviewed    Imaging:    Imaging Reports Reviewed Today Include: none  Imaging Personally Reviewed by Myself Includes:  none    Recent Cultures (last 7 days):     Results from last 7 days   Lab Units 04/11/21  1536 04/11/21  1501   BLOOD CULTURE  No Growth at 24 hrs  No Growth at 24 hrs         Last 24 Hours Medication List:   Current Facility-Administered Medications   Medication Dose Route Frequency Provider Last Rate    clonazePAM  1 mg Oral BID Danuta Milton MD      donepezil  5 mg Oral HS Dantua Milton MD      escitalopram  10 mg Oral Daily Danuta Milton MD      escitalopram  20 mg Oral HS Danuta Milton MD      LORazepam  1 mg Oral Q8H PRN Danuta Milton MD      multivitamin-minerals  1 tablet Oral Daily Jaiden Gonzalez MD      OLANZapine  10 mg Oral HS Danuta Milton MD      OLANZapine  5 mg Oral BID Danuta Milton MD      QUEtiapine  25 mg Oral Daily Danuta Milton MD      QUEtiapine  50 mg Oral HS Danuta Milton MD     [employer] sodium chloride  1 spray Each Nare Q1H PRN Umu Ballard MD      sodium chloride  75 mL/hr Intravenous Continuous Frank Trejo MD 75 mL/hr (04/13/21 0703)        Today, Patient Was Seen By: Lilia Zhang MD    ** Please Note: This note has been constructed using a voice recognition system   **

## 2021-04-13 NOTE — CASE MANAGEMENT
Cm met with pt and family and family friend to discuss care for pt  Family friend requested CM to contact [place] as they are attempting to get care in the home for the pt  She states the family has applied for the services however because the family is not able to find the POA, the process will take longer  Pt's family friend states she was told to have CM contact them in order to assist with speeding up the process for aids in the home  Cm call the Independent Enrollment  Line ([phone]) however was not able to speak with anyone  There was a recording and then requested a case number or  it would drop the call  This was explained to pt's family friend  Cm offered us all to call via speaker phone however the family friend had to leave to  her grandchildren  She stated she will be here tomorrow morning and cm suggested coming to the room and calling together  She is in agreement with this  Cm will continue to follow to assist with needs

## 2021-04-14 PROCEDURE — 99233 SBSQ HOSP IP/OBS HIGH 50: CPT | Performed by: INTERNAL MEDICINE

## 2021-04-14 RX ORDER — FUROSEMIDE 10 MG/ML
20 INJECTION INTRAMUSCULAR; INTRAVENOUS ONCE
Status: COMPLETED | OUTPATIENT
Start: 2021-04-14 | End: 2021-04-14

## 2021-04-14 RX ADMIN — ESCITALOPRAM OXALATE 20 MG: 20 TABLET ORAL at 21:37

## 2021-04-14 RX ADMIN — QUETIAPINE FUMARATE 25 MG: 25 TABLET ORAL at 13:45

## 2021-04-14 RX ADMIN — OLANZAPINE 5 MG: 2.5 TABLET, FILM COATED ORAL at 09:02

## 2021-04-14 RX ADMIN — CLONAZEPAM 1 MG: 1 TABLET ORAL at 18:24

## 2021-04-14 RX ADMIN — OLANZAPINE 10 MG: 10 TABLET, FILM COATED ORAL at 21:37

## 2021-04-14 RX ADMIN — Medication 1 TABLET: at 09:02

## 2021-04-14 RX ADMIN — OLANZAPINE 5 MG: 2.5 TABLET, FILM COATED ORAL at 18:23

## 2021-04-14 RX ADMIN — QUETIAPINE FUMARATE 50 MG: 25 TABLET ORAL at 21:37

## 2021-04-14 RX ADMIN — SIMETHICONE 80 MG: 80 TABLET, CHEWABLE ORAL at 13:48

## 2021-04-14 RX ADMIN — SALINE NASAL SPRAY 1 SPRAY: 1.5 SOLUTION NASAL at 09:05

## 2021-04-14 RX ADMIN — CLONAZEPAM 1 MG: 1 TABLET ORAL at 09:03

## 2021-04-14 RX ADMIN — FUROSEMIDE 20 MG: 10 INJECTION, SOLUTION INTRAVENOUS at 09:07

## 2021-04-14 RX ADMIN — DONEPEZIL HYDROCHLORIDE 5 MG: 5 TABLET, FILM COATED ORAL at 21:37

## 2021-04-14 RX ADMIN — ESCITALOPRAM OXALATE 10 MG: 10 TABLET ORAL at 09:02

## 2021-04-14 NOTE — PROGRESS NOTES
[facility]  Progress Note - Radhadeyanira Huff 7/1/1974, 48 y o  female MRN: [id number]  Unit/Bed#: S -01 Encounter: [id number]  Primary Care Provider: Bobby Dave MD   Date and time admitted to hospital: 4/11/2021  2:19 PM    * Acute respiratory failure with hypoxia [facility])  Assessment & Plan  Persistent- Likely secondary to choking/aspiration  On admission, satting 70% placed on high-flow which then titrated down to 6 L nasal  CXR unremarkable  Abx held, procalc x1 negative  Continues to require 5 liters of oxygen  Will check CXR today to assess more  Plan:  · Maintain o2 >88%, wean oxygen  · Ocean spray for congestion  · Low suspicion for aspiration pna, no Abx  · Aspiration precautions encouraged for home, mother aware  · Resume dysphagia diet 2, gluten free as requested by mother  Will place simethicone  · CXR    Cardiac arrest with Acute anoxic encephalopathy  Assessment & Plan  In the setting of Cardiac Arrest a/e/b change in mental status, unresponsiveness and agitation treated with CPR, O2, VS monitoring, fall precautions, restraints and reorientation   Findings: 4/11 ED- " Patient went unresponsive at home shortly after choking  When EMS arrived at the scene they performed the Heimlich maneuver  She lost pulses so CPR was initiated  After a couple of rounds of CPR she had ROSC   EMS had difficulty oxygenating her EN route to the emergency department due to agitation  Anxiety  Assessment & Plan  Continue home medications of Lexapro and Ativan p r n  Dementia ([facility] [address] )  Assessment & Plan  Continue donepezil, Zyprexa and Seroquel home medication  Supportive care    Down syndrome  Assessment & Plan  Continue supportive care  VTE Pharmacologic Prophylaxis:   Pharmacologic: low risk  Mechanical VTE Prophylaxis in Place: Yes    Patient Centered Rounds: I have performed bedside rounds with nursing staff today      Discussions with Specialists or Other Care Team Provider: PCa at the bedside    Education and Discussions with Family / Patient: Caretaker at bedside    Time Spent for Care: 30 minutes  More than 50% of total time spent on counseling and coordination of care as described above  Current Length of Stay: 3 day(s)    Current Patient Status: Inpatient   Certification Statement: The patient will continue to require additional inpatient hospital stay due to acute hypoxic respiratory failure    Discharge Plan: when off of oxygen, home with potential VNA support    Code Status: Level 1 - Full Code      Subjective:   Her caretaker reports a good night where she slept most the night  She is eating well  She appears more comfortable  Ms Kristyn Senior is unable to participate in HPI    Objective:     Vitals:   Temp (24hrs), Av 5 °F (36 9 °C), Min:98 4 °F (36 9 °C), Max:98 7 °F (37 1 °C)    Temp:  [98 4 °F (36 9 °C)-98 7 °F (37 1 °C)] 98 5 °F (36 9 °C)  HR:  [61-81] 71  Resp:  [16-20] 16  BP: (104-125)/(51-65) 104/51  SpO2:  [91 %-93 %] 91 %  Body mass index is 30 83 kg/m²  Input and Output Summary (last 24 hours): Intake/Output Summary (Last 24 hours) at 2021 0806  Last data filed at 2021 0759  Gross per 24 hour   Intake 1000 ml   Output 0 ml   Net 1000 ml       Physical Exam:     Physical Exam  Vitals signs and nursing note reviewed  Exam conducted with a chaperone present  Constitutional:       General: She is not in acute distress  Appearance: She is not ill-appearing, toxic-appearing or diaphoretic  HENT:      Nose: Congestion and rhinorrhea present  Mouth/Throat:      Mouth: Mucous membranes are moist       Pharynx: No oropharyngeal exudate  Eyes:      Conjunctiva/sclera: Conjunctivae normal    Neck:      Musculoskeletal: Normal range of motion and neck supple  Pulmonary:      Effort: Pulmonary effort is normal  No respiratory distress  Breath sounds: Normal breath sounds  No wheezing or rales        Comments: Poor effort and unable to follow commands for deep breaths  Chest:      Chest wall: No tenderness  Abdominal:      General: Abdomen is flat  Bowel sounds are normal  There is no distension  Palpations: Abdomen is soft  Musculoskeletal: Normal range of motion  Skin:     General: Skin is warm  Neurological:      Mental Status: She is alert  Caretaker at bedside      Additional Data:     Labs:    Results from last 7 days   Lab Units 04/13/21  0502  04/11/21  1501   WBC Thousand/uL 7 21   < > 5 22   HEMOGLOBIN g/dL 11 8   < > 13 1   HEMATOCRIT % 37 7   < > 40 3   PLATELETS Thousands/uL 150   < > 157   BANDS PCT %  --   --  5   LYMPHO PCT %  --   --  18   MONO PCT %  --   --  5   EOS PCT %  --   --  0    < > = values in this interval not displayed  Results from last 7 days   Lab Units 04/13/21  0502  04/11/21  1500   SODIUM mmol/L 144   < > 142   POTASSIUM mmol/L 3 6   < > 3 7   CHLORIDE mmol/L 111*   < > 105   CO2 mmol/L 26   < > 29   BUN mg/dL 10   < > 20   CREATININE mg/dL 0 97   < > 1 18   ANION GAP mmol/L 7   < > 8   CALCIUM mg/dL 7 5*   < > 7 7*   ALBUMIN g/dL  --   --  3 1*   TOTAL BILIRUBIN mg/dL  --   --  0 62   ALK PHOS U/L  --   --  183*   ALT U/L  --   --  67   AST U/L  --   --  57*   GLUCOSE RANDOM mg/dL 88   < > 147*    < > = values in this interval not displayed  Results from last 7 days   Lab Units 04/11/21  1541   INR  1 02         Results from last 7 days   Lab Units 04/12/21  0439   HEMOGLOBIN A1C % 5 2     Results from last 7 days   Lab Units 04/12/21  0439 04/11/21  1825 04/11/21  1541   LACTIC ACID mmol/L  --  1 5 2 7*   PROCALCITONIN ng/ml <0 05  --  <0 05           * I Have Reviewed All Lab Data Listed Above  * Additional Pertinent Lab Tests Reviewed:  Samuel 64 Admission Reviewed    Imaging:    Imaging Reports Reviewed Today Include: CXR  Imaging Personally Reviewed by Myself Includes:  CXR 4/11 and 4/13    Recent Cultures (last 7 days):     Results from last 7 days   Lab Units 04/11/21  1536 04/11/21  1501   BLOOD CULTURE  No Growth at 48 hrs  No Growth at 48 hrs  Last 24 Hours Medication List:   Current Facility-Administered Medications   Medication Dose Route Frequency Provider Last Rate    clonazePAM  1 mg Oral BID Jenny Mckinney MD      donepezil  5 mg Oral HS Jenny Mckinney MD      escitalopram  10 mg Oral Daily Jenny Mckinney MD      escitalopram  20 mg Oral HS Jenny Mckinney MD      LORazepam  1 mg Oral Q8H PRN Jenny Mckinney MD      multivitamin-minerals  1 tablet Oral Daily Leonard Stinson MD      OLANZapine  10 mg Oral HS Jenny Mckinney MD      OLANZapine  5 mg Oral BID Jenny Mckinney MD      QUEtiapine  25 mg Oral Daily Jenny Mckinney MD      QUEtiapine  50 mg Oral HS Jenny Mckinney MD      simethicone  80 mg Oral Q6H PRN Leonard Stinson MD      sodium chloride  1 spray Each Nare Q1H PRN Alcides Mariee MD      sodium chloride  75 mL/hr Intravenous Continuous Jenny Mckinney MD 75 mL/hr (04/13/21 2124)        Today, Patient Was Seen By: Kika Ahn MD    ** Please Note: Dictation voice to text software may have been used in the creation of this document   **

## 2021-04-14 NOTE — ASSESSMENT & PLAN NOTE
Persistent- Likely secondary to choking/aspiration  On admission, satting 70% placed on high-flow which then titrated down to 6 L nasal  CXR unremarkable  Abx held, procalc x1 negative  Continues to require 5 liters of oxygen  Will check CXR today to assess more  Lasix 20mg IV one time (continuous fluids and aggressive resuscitation on admission)    Plan:  · Maintain o2 >88%, wean oxygen  · Ocean spray for congestion  · Low suspicion for aspiration pna, no Abx  · Aspiration precautions encouraged for home, mother aware  · Resume dysphagia diet 2, gluten free as requested by mother   Will place simethicone  · CXR, lasix 20mg IV

## 2021-04-14 NOTE — PLAN OF CARE
Problem: SAFETY,RESTRAINT: NV/NON-SELF DESTRUCTIVE BEHAVIOR  Goal: Remains free of harm/injury (restraint for non violent/non self-detsructive behavior)  Description: INTERVENTIONS:  - Instruct patient/family regarding restraint use   - Assess and monitor physiologic and psychological status   - Provide interventions and comfort measures to meet assessed patient needs   - Identify and implement measures to help patient regain control  - Assess readiness for release of restraint   Outcome: Progressing  Goal: Returns to optimal restraint-free functioning  Description: INTERVENTIONS:  - Assess the patient's behavior and symptoms that indicate continued need for restraint  - Identify and implement measures to help patient regain control  - Assess readiness for release of restraint   Outcome: Progressing     Problem: Nutrition/Hydration-ADULT  Goal: Nutrient/Hydration intake appropriate for improving, restoring or maintaining nutritional needs  Description: Monitor and assess patient's nutrition/hydration status for malnutrition  Collaborate with interdisciplinary team and initiate plan and interventions as ordered  Monitor patient's weight and dietary intake as ordered or per policy  Utilize nutrition screening tool and intervene as necessary  Determine patient's food preferences and provide high-protein, high-caloric foods as appropriate       INTERVENTIONS:  - Monitor oral intake, urinary output, labs, and treatment plans  - Assess nutrition and hydration status and recommend course of action  - Evaluate amount of meals eaten  - Assist patient with eating if necessary   - Allow adequate time for meals  - Recommend/ encourage appropriate diets, oral nutritional supplements, and vitamin/mineral supplements  - Assess need for intravenous fluids  - Provide specific nutrition/hydration education as appropriate  - Include patient/family/caregiver in decisions related to nutrition  Outcome: Progressing

## 2021-04-15 PROBLEM — I46.9 CARDIAC ARREST (HCC): Status: RESOLVED | Noted: 2021-04-11 | Resolved: 2021-04-15

## 2021-04-15 LAB
ANION GAP SERPL CALCULATED.3IONS-SCNC: 5 MMOL/L (ref 4–13)
BUN SERPL-MCNC: 13 MG/DL (ref 5–25)
CALCIUM SERPL-MCNC: 8 MG/DL (ref 8.3–10.1)
CHLORIDE SERPL-SCNC: 106 MMOL/L (ref 100–108)
CO2 SERPL-SCNC: 30 MMOL/L (ref 21–32)
CREAT SERPL-MCNC: 1.16 MG/DL (ref 0.6–1.3)
ERYTHROCYTE [DISTWIDTH] IN BLOOD BY AUTOMATED COUNT: 14.5 % (ref 11.6–15.1)
GFR SERPL CREATININE-BSD FRML MDRD: 56 ML/MIN/1.73SQ M
GLUCOSE SERPL-MCNC: 90 MG/DL (ref 65–140)
HCT VFR BLD AUTO: 38.4 % (ref 34.8–46.1)
HGB BLD-MCNC: 12.6 G/DL (ref 11.5–15.4)
MCH RBC QN AUTO: 34.4 PG (ref 26.8–34.3)
MCHC RBC AUTO-ENTMCNC: 32.8 G/DL (ref 31.4–37.4)
MCV RBC AUTO: 105 FL (ref 82–98)
PLATELET # BLD AUTO: 182 THOUSANDS/UL (ref 149–390)
PMV BLD AUTO: 10.5 FL (ref 8.9–12.7)
POTASSIUM SERPL-SCNC: 3.5 MMOL/L (ref 3.5–5.3)
PROCALCITONIN SERPL-MCNC: <0.05 NG/ML
RBC # BLD AUTO: 3.66 MILLION/UL (ref 3.81–5.12)
SODIUM SERPL-SCNC: 141 MMOL/L (ref 136–145)
WBC # BLD AUTO: 6.11 THOUSAND/UL (ref 4.31–10.16)

## 2021-04-15 PROCEDURE — 80048 BASIC METABOLIC PNL TOTAL CA: CPT | Performed by: INTERNAL MEDICINE

## 2021-04-15 PROCEDURE — 84145 PROCALCITONIN (PCT): CPT | Performed by: INTERNAL MEDICINE

## 2021-04-15 PROCEDURE — 99232 SBSQ HOSP IP/OBS MODERATE 35: CPT | Performed by: INTERNAL MEDICINE

## 2021-04-15 PROCEDURE — 85027 COMPLETE CBC AUTOMATED: CPT | Performed by: INTERNAL MEDICINE

## 2021-04-15 PROCEDURE — 92526 ORAL FUNCTION THERAPY: CPT

## 2021-04-15 RX ORDER — POLYETHYLENE GLYCOL 3350 17 G/17G
17 POWDER, FOR SOLUTION ORAL DAILY
Status: DISCONTINUED | OUTPATIENT
Start: 2021-04-15 | End: 2021-04-18 | Stop reason: HOSPADM

## 2021-04-15 RX ADMIN — DONEPEZIL HYDROCHLORIDE 5 MG: 5 TABLET, FILM COATED ORAL at 22:05

## 2021-04-15 RX ADMIN — CLONAZEPAM 1 MG: 1 TABLET ORAL at 08:53

## 2021-04-15 RX ADMIN — Medication 1 TABLET: at 08:53

## 2021-04-15 RX ADMIN — OLANZAPINE 10 MG: 10 TABLET, FILM COATED ORAL at 22:05

## 2021-04-15 RX ADMIN — ESCITALOPRAM OXALATE 20 MG: 20 TABLET ORAL at 22:05

## 2021-04-15 RX ADMIN — ESCITALOPRAM OXALATE 10 MG: 10 TABLET ORAL at 08:53

## 2021-04-15 RX ADMIN — OLANZAPINE 5 MG: 2.5 TABLET, FILM COATED ORAL at 08:53

## 2021-04-15 RX ADMIN — SALINE NASAL SPRAY 1 SPRAY: 1.5 SOLUTION NASAL at 22:07

## 2021-04-15 RX ADMIN — QUETIAPINE FUMARATE 50 MG: 25 TABLET ORAL at 22:05

## 2021-04-15 RX ADMIN — POLYETHYLENE GLYCOL 3350 17 G: 17 POWDER, FOR SOLUTION ORAL at 09:45

## 2021-04-15 RX ADMIN — CLONAZEPAM 1 MG: 1 TABLET ORAL at 16:51

## 2021-04-15 RX ADMIN — OLANZAPINE 5 MG: 2.5 TABLET, FILM COATED ORAL at 16:50

## 2021-04-15 RX ADMIN — QUETIAPINE FUMARATE 25 MG: 25 TABLET ORAL at 12:26

## 2021-04-15 NOTE — PROGRESS NOTES
[facility]  Progress Note - Winda Scales 7/1/1974, 48 y o  female MRN: [id number]  Unit/Bed#: S -01 Encounter: [id number]  Primary Care Provider: Bill Landry MD   Date and time admitted to hospital: 4/11/2021  2:19 PM    * Acute respiratory failure with hypoxia [facility])  Assessment & Plan  Persistent- Likely secondary to choking/aspiration  On admission, satting 70% placed on high-flow which then titrated down to 6 L nasal  CXR unremarkable  Abx held, procalc x1 negative  Continues to require more oxygen -- overnight to 6L and now at 5 5 liters of oxygen  CXR 04/14 - Mild bibasilar opacity, greater on the left  Lasix 20mg IV one time (continuous fluids and aggressive resuscitation on admission) on 04/14, discussed with nursing to obtain weight and weight urine pads to help assess output  Mother states no issues urinating as of now  Her CXR was clear in regards to fluids  Goal for today will be to get OOB to help with BM and respiration  Discussed with nursing to work our way slowly with removing and restraints and trying to accomplish this  CXR showing concerns for pna, will determine if she needs Abx  Plan:  · Maintain o2 >88%, wean oxygen  · Follow procalc  Low suspicion for aspiration pna, no Abx  · Ocean spray for congestion, Incentive spirometer  · Aspiration precautions  Resume dysphagia diet 2, gluten free as requested by mother  Will place simethicone  · OOB  Patient has no BM, will give miralax as this could assist with improved respiration  Cardiac arrest with Acute anoxic encephalopathy-resolved as of 4/15/2021  Assessment & Plan  In the setting of Cardiac Arrest a/e/b change in mental status, unresponsiveness and agitation treated with CPR, O2, VS monitoring, fall precautions, restraints and reorientation   Findings: 4/11 ED- " Patient went unresponsive at home shortly after choking  When EMS arrived at the scene they performed the Heimlich maneuver  She lost pulses so CPR was initiated  After a couple of rounds of CPR she had ROSC   EMS had difficulty oxygenating her EN route to the emergency department due to agitation  Elevated MCV  Assessment & Plan  Considering history of down's could be 2/2 thyroid dysfunction however will not obtain at the moment considering cardiac arrest  To be considered by PCP in outpatient setting  Folate noted to be low normal at 3 3 -- will start on multivitamin    Anxiety  Assessment & Plan  Continue home medications of Lexapro and Ativan p r n  Dementia ([facility] [address] )  Assessment & Plan  Continue donepezil, Zyprexa and Seroquel home medication  Supportive care    Down syndrome  Assessment & Plan  Continue supportive care  VTE Pharmacologic Prophylaxis:   Pharmacologic: low VTE  Mechanical VTE Prophylaxis in Place: No    Discussions with Specialists or Other Care Team Provider: nursing    Education and Discussions with Family / Patient: assessment and plan discussed with mother at bedside    Current Length of Stay: 4 day(s)    Current Patient Status: Inpatient     Discharge Plan / Estimated Discharge Date: to be determined on oxygen needs    Code Status: Level 1 - Full Code      Subjective:   No BM since admission  Continues to have stuffy nose but better with saline spray  No other complaints as per mother eating and drinking well  Objective:     Vitals:   Temp (24hrs), Av °F (36 7 °C), Min:97 7 °F (36 5 °C), Max:98 3 °F (36 8 °C)    Temp:  [97 7 °F (36 5 °C)-98 3 °F (36 8 °C)] 98 °F (36 7 °C)  HR:  [63-73] 72  Resp:  [16-20] 20  BP: (84-99)/(45-56) 86/46  SpO2:  [90 %-93 %] 90 %  Body mass index is 30 83 kg/m²  Input and Output Summary (last 24 hours): Intake/Output Summary (Last 24 hours) at 4/15/2021 0903  Last data filed at 2021 2152  Gross per 24 hour   Intake 300 ml   Output --   Net 300 ml       Physical Exam:     Physical Exam  Vitals signs and nursing note reviewed     Constitutional: Appearance: Normal appearance  She is not ill-appearing  Cardiovascular:      Rate and Rhythm: Normal rate and regular rhythm  Pulses: Normal pulses  Heart sounds: Normal heart sounds  Pulmonary:      Effort: Pulmonary effort is normal       Breath sounds: Normal breath sounds  No rhonchi  Comments: NC 5 5L  Abdominal:      General: Bowel sounds are normal  There is no distension  Palpations: Abdomen is soft  Tenderness: There is no abdominal tenderness  Musculoskeletal:      Right lower leg: No edema  Left lower leg: No edema  Skin:     General: Skin is warm and dry  Neurological:      General: No focal deficit present  Mental Status: She is easily aroused  Mental status is at baseline  Psychiatric:         Behavior: Behavior normal            Additional Data:     Labs:    Results from last 7 days   Lab Units 04/15/21  0753  04/11/21  1501   WBC Thousand/uL 6 11   < > 5 22   HEMOGLOBIN g/dL 12 6   < > 13 1   HEMATOCRIT % 38 4   < > 40 3   PLATELETS Thousands/uL 182   < > 157   LYMPHO PCT %  --   --  18   MONO PCT %  --   --  5   EOS PCT %  --   --  0    < > = values in this interval not displayed  Results from last 7 days   Lab Units 04/15/21  0753  04/11/21  1500   POTASSIUM mmol/L 3 5   < > 3 7   CHLORIDE mmol/L 106   < > 105   CO2 mmol/L 30   < > 29   BUN mg/dL 13   < > 20   CREATININE mg/dL 1 16   < > 1 18   CALCIUM mg/dL 8 0*   < > 7 7*   ALK PHOS U/L  --   --  183*   ALT U/L  --   --  67   AST U/L  --   --  57*    < > = values in this interval not displayed  Results from last 7 days   Lab Units 04/11/21  1541   INR  1 02       * I Have Reviewed All Lab Data Listed Above  * Additional Pertinent Lab Tests Reviewed:  Samuel 66 Admission Reviewed    Imaging:    Imaging Reports Reviewed Today Include: CXR  Imaging Personally Reviewed by Myself Includes:  none    Recent Cultures (last 7 days):     Results from last 7 days   Lab Units 04/11/21  1536 04/11/21  1501   BLOOD CULTURE  No Growth at 72 hrs  No Growth at 72 hrs  Last 24 Hours Medication List:   Current Facility-Administered Medications   Medication Dose Route Frequency Provider Last Rate    clonazePAM  1 mg Oral BID Faisal Hudson MD      donepezil  5 mg Oral HS Faisal Hudson MD      escitalopram  10 mg Oral Daily Faisal Hudson MD      escitalopram  20 mg Oral HS Faisal Hudson MD      LORazepam  1 mg Oral Q8H PRN Faisal Hudson MD      multivitamin-minerals  1 tablet Oral Daily Suzanna Gordon MD      OLANZapine  10 mg Oral HS Faisal Hudson MD      OLANZapine  5 mg Oral BID Faisal Hudson MD      polyethylene glycol  17 g Oral Daily Suzanna Gordon MD      QUEtiapine  25 mg Oral Daily Faisal Hudson MD      QUEtiapine  50 mg Oral HS Faisal Hudson MD      simethicone  80 mg Oral Q6H PRN Suzanna Gordon MD      sodium chloride  1 spray Each Nare Q1H PRN Makenna Dillard MD          Today, Patient Was Seen By: Suzanna Gordon MD    ** Please Note: This note has been constructed using a voice recognition system   **

## 2021-04-15 NOTE — CASE MANAGEMENT
Cm contacted the [employer]  at [phone], pressed option 1 and waited until a live person came on the phone  Cm spoke with Reyna Kimble asked if there was any way for cm to assist with expediting the referral process for pt that is currently in the hospital   Yordan Annalisa placed cm on hold and spoke with her supervisor, Man Gifford, who stated there is no way for CM to expedite the process for the waiver services except to assist the family with gathering all the information to provide to the agency  Per family friend, Beata Garza, all information has been provided  Cm will continue to follow to assist with needs

## 2021-04-15 NOTE — CASE MANAGEMENT
Cm contacted family friend, Viktoriya Oviedo, as requested by the pt's mother, to inform her there is nothing cm is able to assist with in order to expedite the waiver process except assist with obtaining the appropriate documentation and getting that to them as soon as possible  Viktoriya Oviedo states they have all documentation except the POA and they are working on obtaining that  Viktoriya Oviedo thanked CM for attempting to assist with expedite the process and stated she would like to have VNA services and HHA arranged for DC  Cm made referral to [facility] as requested by Viktoriya Oviedo and the family  VNA is able to accept pt and contact information has been placed in pt's chart

## 2021-04-15 NOTE — PLAN OF CARE
Problem: SAFETY,RESTRAINT: NV/NON-SELF DESTRUCTIVE BEHAVIOR  Goal: Remains free of harm/injury (restraint for non violent/non self-detsructive behavior)  Description: INTERVENTIONS:  - Instruct patient/family regarding restraint use   - Assess and monitor physiologic and psychological status   - Provide interventions and comfort measures to meet assessed patient needs   - Identify and implement measures to help patient regain control  - Assess readiness for release of restraint   4/15/2021 1947 by Enrique Coy RN  Outcome: Completed  4/15/2021 1947 by Enrique Coy RN  Outcome: Progressing  Goal: Returns to optimal restraint-free functioning  Description: INTERVENTIONS:  - Assess the patient's behavior and symptoms that indicate continued need for restraint  - Identify and implement measures to help patient regain control  - Assess readiness for release of restraint   4/15/2021 1947 by Enrique Coy RN  Outcome: Completed  4/15/2021 1947 by Enrique Coy RN  Outcome: Progressing     Problem: Nutrition/Hydration-ADULT  Goal: Nutrient/Hydration intake appropriate for improving, restoring or maintaining nutritional needs  Description: Monitor and assess patient's nutrition/hydration status for malnutrition  Collaborate with interdisciplinary team and initiate plan and interventions as ordered  Monitor patient's weight and dietary intake as ordered or per policy  Utilize nutrition screening tool and intervene as necessary  Determine patient's food preferences and provide high-protein, high-caloric foods as appropriate       INTERVENTIONS:  - Monitor oral intake, urinary output, labs, and treatment plans  - Assess nutrition and hydration status and recommend course of action  - Evaluate amount of meals eaten  - Assist patient with eating if necessary   - Allow adequate time for meals  - Recommend/ encourage appropriate diets, oral nutritional supplements, and vitamin/mineral supplements  - Assess need for intravenous fluids  - Provide specific nutrition/hydration education as appropriate  - Include patient/family/caregiver in decisions related to nutrition  4/15/2021 1947 by Romie Lopez RN  Outcome: Progressing  4/15/2021 1947 by Romie Lopez, RN  Outcome: Progressing

## 2021-04-15 NOTE — ASSESSMENT & PLAN NOTE
Persistent- Likely secondary to choking/aspiration  On admission, satting 70% placed on high-flow which then titrated down to 6 L nasal  CXR unremarkable  Abx held, procalc x1 negative  Continues to require more oxygen -- overnight to 6L and now at 5 5 liters of oxygen  CXR 04/14 - Mild bibasilar opacity, greater on the left  Lasix 20mg IV one time (continuous fluids and aggressive resuscitation on admission) on 04/14, discussed with nursing to obtain weight and weight urine pads to help assess output  Mother states no issues urinating as of now  Her CXR was clear in regards to fluids  Goal for today will be to get OOB to help with BM and respiration  Discussed with nursing to work our way slowly with removing and restraints and trying to accomplish this  CXR showing concerns for pna, will determine if she needs Abx  Plan:  · Maintain o2 >88%, wean oxygen  · Follow procalc  Low suspicion for aspiration pna, no Abx  · Ocean spray for congestion, Incentive spirometer  · Aspiration precautions  Resume dysphagia diet 2, gluten free as requested by mother  Will place simethicone  · OOB  Patient has no BM, will give miralax as this could assist with improved respiration

## 2021-04-15 NOTE — SPEECH THERAPY NOTE
Speech Language/Pathology    Speech/Language Pathology Progress Note    Patient Name: Orlin Burkitt  [id number] Date: 4/15/2021    Subjective:  Pt was seen for dysphagia tx follow up at lunch  Pt's stepmother was present during session, and stated that the pt's current diet is very similar to what she eats at home and that she has been happy with the size the food has been coming  She denies any coughing or choking w/ po, but states pt eats very quickly at baseline  Pt's stepmother stated that she does not feel pt's diet needs to be advanced  Pt was sitting upright in bed for meal, and is currently on a dysphagia 2 diet and thin liquids  Objective:  Pt was seen w/ mashed potatoes w/ gravy, diced chicken, and thin liquids by cup and straw  Pt's mom mixed potatoes and chicken, breaking up any larger pieces  Pt was able to feed self, but was a bit impulsive w/ eating and took large bites  Pt required cues to masticate and eat slower  Pt had decreased mastication, and quick transfer of food  Pt appeared w/ good oral control of liquids  No s/s of aspiration noted  Assessment:  Pt appears to be tolerating current diet w/o s/s of aspiration  Pt's stepmother reports that pt's impulsive eating behaviors are very typical and similar to how she eats at home, which is why pt is supervised during meals  Pt's stepmother feels current diet is about baseline for pt  Plan/Recommendations:  Cont dysphagia 2 diet and thin liquids  Close supervision during meals  Aspiration precautions  Reminders to take small bites and chew food  No further ST tx warranted at this time

## 2021-04-15 NOTE — PLAN OF CARE
Cont dysphagia 2 diet and thin liquids  Meds as tolerated  Aspiration precautions  Reminders to chew and eat slowly  Close supervision during meals

## 2021-04-16 LAB
BACTERIA BLD CULT: NORMAL
BACTERIA BLD CULT: NORMAL
PROCALCITONIN SERPL-MCNC: <0.05 NG/ML

## 2021-04-16 PROCEDURE — 99232 SBSQ HOSP IP/OBS MODERATE 35: CPT | Performed by: INTERNAL MEDICINE

## 2021-04-16 PROCEDURE — 84145 PROCALCITONIN (PCT): CPT | Performed by: INTERNAL MEDICINE

## 2021-04-16 RX ADMIN — ESCITALOPRAM OXALATE 20 MG: 20 TABLET ORAL at 21:28

## 2021-04-16 RX ADMIN — OLANZAPINE 5 MG: 2.5 TABLET, FILM COATED ORAL at 17:15

## 2021-04-16 RX ADMIN — QUETIAPINE FUMARATE 25 MG: 25 TABLET ORAL at 12:56

## 2021-04-16 RX ADMIN — OLANZAPINE 10 MG: 10 TABLET, FILM COATED ORAL at 21:27

## 2021-04-16 RX ADMIN — CLONAZEPAM 1 MG: 1 TABLET ORAL at 08:55

## 2021-04-16 RX ADMIN — ESCITALOPRAM OXALATE 10 MG: 10 TABLET ORAL at 08:55

## 2021-04-16 RX ADMIN — DONEPEZIL HYDROCHLORIDE 5 MG: 5 TABLET, FILM COATED ORAL at 21:28

## 2021-04-16 RX ADMIN — POLYETHYLENE GLYCOL 3350 17 G: 17 POWDER, FOR SOLUTION ORAL at 08:55

## 2021-04-16 RX ADMIN — CLONAZEPAM 1 MG: 1 TABLET ORAL at 17:15

## 2021-04-16 RX ADMIN — Medication 1 TABLET: at 08:54

## 2021-04-16 RX ADMIN — QUETIAPINE FUMARATE 50 MG: 25 TABLET ORAL at 21:28

## 2021-04-16 RX ADMIN — OLANZAPINE 5 MG: 2.5 TABLET, FILM COATED ORAL at 08:55

## 2021-04-16 NOTE — ASSESSMENT & PLAN NOTE
Persistent- Likely secondary to choking/aspiration  On admission, satting 70% placed on high-flow which then titrated down to 6 L nasal  CXR unremarkable  Abx held, procalc x1 negative  CXR 04/14 - Mild bibasilar opacity, greater on the left  Lasix 20mg IV one time (continuous fluids and aggressive resuscitation on admission) on 04/14  No signs or labs indication infection/pna  Continues to require oxygen -- overnight from 6L to 2L saturating low to mid 90s  Patient did not walk yesterday, but she was OOB today  Will discuss with nursing again today  Will discuss home o2 eval   Plan:  · Maintain o2 >88%, wean oxygen  · Low suspicion for aspiration pna, no Abx  · Ocean spray for congestion, Incentive spirometer  · Aspiration precautions  Resume dysphagia diet 2, gluten free as requested by mother  · OOB  Continue simethicone and miralax, helping with BM

## 2021-04-16 NOTE — PROGRESS NOTES
[facility]  Progress Note - Ivan Sanford 7/1/1974, 48 y o  female MRN: [id number]  Unit/Bed#: S -01 Encounter: [id number]  Primary Care Provider: Domenica Le MD   Date and time admitted to hospital: 4/11/2021  2:19 PM    * Acute respiratory failure with hypoxia [facility])  Assessment & Plan  Persistent- Likely secondary to choking/aspiration  On admission, satting 70% placed on high-flow which then titrated down to 6 L nasal  CXR unremarkable  Abx held, procalc x1 negative  CXR 04/14 - Mild bibasilar opacity, greater on the left  Lasix 20mg IV one time (continuous fluids and aggressive resuscitation on admission) on 04/14  No signs or labs indication infection/pna  Continues to require oxygen -- overnight from 6L to 2L saturating low to mid 90s  Patient did not walk yesterday, but she was OOB today  Will discuss with nursing again today  Will discuss home o2 eval   Plan:  · Maintain o2 >88%, wean oxygen  · Low suspicion for aspiration pna, no Abx  · Ocean spray for congestion, Incentive spirometer  · Aspiration precautions  Resume dysphagia diet 2, gluten free as requested by mother  · OOB  Continue simethicone and miralax, helping with BM  Elevated MCV  Assessment & Plan  Considering history of down's could be 2/2 thyroid dysfunction however will not obtain at the moment considering cardiac arrest  To be considered by PCP in outpatient setting  Folate noted to be low normal at 3 3 -- will start on multivitamin    Anxiety  Assessment & Plan  Continue home medications of Lexapro and Ativan p r n  Dementia ([facility] [address] )  Assessment & Plan  Continue donepezil, Zyprexa and Seroquel home medication  Supportive care    Down syndrome  Assessment & Plan  Continue supportive care          VTE Pharmacologic Prophylaxis:   Pharmacologic: 2L  Mechanical VTE Prophylaxis in Place: No    Discussions with Specialists or Other Care Team Provider: nursing    Education and Discussions with Family / Patient: assessment and plan discussed with mother at bedside  Current Length of Stay: 5 day(s)    Current Patient Status: Inpatient     Discharge Plan / Estimated Discharge Date: 24-48 hours depending on oxygen needs    Code Status: Level 1 - Full Code      Subjective:   No complaints from mother  No pain on palpation  Objective:     Vitals:   Temp (24hrs), Av 9 °F (36 6 °C), Min:97 7 °F (36 5 °C), Max:98 3 °F (36 8 °C)    Temp:  [97 7 °F (36 5 °C)-98 3 °F (36 8 °C)] 97 9 °F (36 6 °C)  HR:  [71-85] 71  Resp:  [20-22] 20  BP: ()/(48-66) 110/66  SpO2:  [86 %-94 %] 90 %  Body mass index is 29 77 kg/m²  Input and Output Summary (last 24 hours): Intake/Output Summary (Last 24 hours) at 2021 0924  Last data filed at 4/15/2021 1729  Gross per 24 hour   Intake --   Output 642 ml   Net -642 ml       Physical Exam:     Physical Exam  Vitals signs and nursing note reviewed  Constitutional:       Appearance: Normal appearance  She is not ill-appearing  Cardiovascular:      Rate and Rhythm: Normal rate and regular rhythm  Pulses: Normal pulses  Heart sounds: Normal heart sounds  Pulmonary:      Effort: Pulmonary effort is normal       Breath sounds: Normal breath sounds  No rhonchi  Comments: NC 2L  Abdominal:      General: Bowel sounds are normal  There is no distension  Palpations: Abdomen is soft  Tenderness: There is no abdominal tenderness  Musculoskeletal:      Right lower leg: No edema  Left lower leg: No edema  Skin:     General: Skin is warm and dry  Neurological:      General: No focal deficit present  Mental Status: She is easily aroused  Mental status is at baseline     Psychiatric:         Behavior: Behavior normal        Additional Data:     Labs:    Results from last 7 days   Lab Units 04/15/21  0753  21  1501   WBC Thousand/uL 6 11   < > 5 22   HEMOGLOBIN g/dL 12 6   < > 13 1   HEMATOCRIT % 38 4   < > 40 3   PLATELETS Thousands/uL 182   < > 157   LYMPHO PCT %  --   --  18   MONO PCT %  --   --  5   EOS PCT %  --   --  0    < > = values in this interval not displayed  Results from last 7 days   Lab Units 04/15/21  0753  04/11/21  1500   POTASSIUM mmol/L 3 5   < > 3 7   CHLORIDE mmol/L 106   < > 105   CO2 mmol/L 30   < > 29   BUN mg/dL 13   < > 20   CREATININE mg/dL 1 16   < > 1 18   CALCIUM mg/dL 8 0*   < > 7 7*   ALK PHOS U/L  --   --  183*   ALT U/L  --   --  67   AST U/L  --   --  57*    < > = values in this interval not displayed  Results from last 7 days   Lab Units 04/11/21  1541   INR  1 02       * I Have Reviewed All Lab Data Listed Above  * Additional Pertinent Lab Tests Reviewed: All Labs Within Last 24 Hours Reviewed    Imaging:    Imaging Reports Reviewed Today Include: none  Imaging Personally Reviewed by Myself Includes:  none    Recent Cultures (last 7 days):     Results from last 7 days   Lab Units 04/11/21  1536 04/11/21  1501   BLOOD CULTURE  No Growth After 4 Days  No Growth After 4 Days         Last 24 Hours Medication List:   Current Facility-Administered Medications   Medication Dose Route Frequency Provider Last Rate    clonazePAM  1 mg Oral BID Lizzie Carroll MD      donepezil  5 mg Oral HS Lizzie Carroll MD      escitalopram  10 mg Oral Daily Lizzie Carroll MD      escitalopram  20 mg Oral HS Lizzie Carroll MD      LORazepam  1 mg Oral Q8H PRN Lizzie Carroll MD      multivitamin-minerals  1 tablet Oral Daily Rodney Padgett MD      OLANZapine  10 mg Oral HS Lizzie Carroll MD      OLANZapine  5 mg Oral BID Lizzie Carroll MD      polyethylene glycol  17 g Oral Daily Rodney Padgett MD      QUEtiapine  25 mg Oral Daily Lizzie Carroll MD      QUEtiapine  50 mg Oral HS Lizzie Carroll MD      simethicone  80 mg Oral Q6H PRN Rodney Padgett MD      sodium chloride  1 spray Each Nare Q1H PRN Cece Anderson MD          Today, Patient Was Seen By: Rodney Padgett MD    ** Please Note: This note has been constructed using a voice recognition system   **

## 2021-04-17 PROCEDURE — 99232 SBSQ HOSP IP/OBS MODERATE 35: CPT | Performed by: INTERNAL MEDICINE

## 2021-04-17 RX ADMIN — ESCITALOPRAM OXALATE 20 MG: 20 TABLET ORAL at 21:21

## 2021-04-17 RX ADMIN — Medication 1 TABLET: at 09:07

## 2021-04-17 RX ADMIN — QUETIAPINE FUMARATE 50 MG: 25 TABLET ORAL at 21:21

## 2021-04-17 RX ADMIN — OLANZAPINE 5 MG: 2.5 TABLET, FILM COATED ORAL at 17:41

## 2021-04-17 RX ADMIN — QUETIAPINE FUMARATE 25 MG: 25 TABLET ORAL at 12:27

## 2021-04-17 RX ADMIN — DONEPEZIL HYDROCHLORIDE 5 MG: 5 TABLET, FILM COATED ORAL at 21:21

## 2021-04-17 RX ADMIN — OLANZAPINE 5 MG: 2.5 TABLET, FILM COATED ORAL at 09:07

## 2021-04-17 RX ADMIN — OLANZAPINE 10 MG: 10 TABLET, FILM COATED ORAL at 21:21

## 2021-04-17 RX ADMIN — CLONAZEPAM 1 MG: 1 TABLET ORAL at 17:41

## 2021-04-17 RX ADMIN — ESCITALOPRAM OXALATE 10 MG: 10 TABLET ORAL at 09:07

## 2021-04-17 RX ADMIN — CLONAZEPAM 1 MG: 1 TABLET ORAL at 09:07

## 2021-04-17 NOTE — ASSESSMENT & PLAN NOTE
Persistent- Likely secondary to choking/aspiration  On admission, satting 70% placed on high-flow which then titrated down to 6 L nasal within 24 hours  CXR unremarkable  Abx held, procalc x1 negative  CXR 04/14 - Mild bibasilar opacity, greater on the left  Lasix 20mg IV one time (continuous fluids and aggressive resuscitation on admission) on 04/14  No signs or labs indication infection/pna  Patient was out of bed for a fair amount of time, walked by nursing yesterday and remained saturating at 95% with 2L  Overnight there are documented sats of 88 and 89, however she is down to 1L with help of nursing team    Plan:  · Maintain o2 >88%, wean oxygen  Will Walk again as discussed with nursing  IF able to remain >88, discharge without oxygen  · Low suspicion for aspiration pna, no Abx given  · Ocean spray for congestion prn, Incentive spirometer encouraged to taken home and practiced  · Aspiration precautions discussed with mother previously  dysphagia diet 2 diet  · OOB  Continue simethicone and miralax prn, helping with BM although may not need these once back home

## 2021-04-17 NOTE — PROGRESS NOTES
[facility]  Progress Note - Cinderella Heart 7/1/1974, 48 y o  female MRN: [id number]  Unit/Bed#: S -01 Encounter: [id number]  Primary Care Provider: Sergio Akbar MD   Date and time admitted to hospital: 4/11/2021  2:19 PM    * Acute respiratory failure with hypoxia [facility])  Assessment & Plan  Persistent- Likely secondary to choking/aspiration  On admission, satting 70% placed on high-flow which then titrated down to 6 L nasal within 24 hours  CXR unremarkable  Abx held, procalc x1 negative  CXR 04/14 - Mild bibasilar opacity, greater on the left  Lasix 20mg IV one time (continuous fluids and aggressive resuscitation on admission) on 04/14  No signs or labs indication infection/pna  Patient was out of bed for a fair amount of time, walked by nursing yesterday and remained saturating at 95% with 2L  Overnight there are documented sats of 88 and 89, however she is down to 1L with help of nursing team    Plan:  · Maintain o2 >88%, wean oxygen  Will Walk again as discussed with nursing  IF able to remain >88, discharge without oxygen  · Low suspicion for aspiration pna, no Abx given  · Ocean spray for congestion prn, Incentive spirometer encouraged to taken home and practiced  · Aspiration precautions discussed with mother previously  dysphagia diet 2 diet  · OOB  Continue simethicone and miralax prn, helping with BM although may not need these once back home  Elevated MCV  Assessment & Plan  Considering history of down's could be 2/2 thyroid dysfunction however will not obtain at the moment considering cardiac arrest  To be considered by PCP in outpatient setting  Folate noted to be low normal at 3 3 -- will start on multivitamin    Anxiety  Assessment & Plan  Continue home medications of Lexapro and Ativan p r n      Dementia ([facility] [address] )  Assessment & Plan  Continue donepezil, Zyprexa and Seroquel home medication      Down syndrome  Assessment & Plan  Continue supportive care as done at home      VTE Pharmacologic Prophylaxis:   Pharmacologic: encourage ambulation  Mechanical VTE Prophylaxis in Place: Yes    Patient Centered Rounds: I have performed bedside rounds with nursing staff today  Education and Discussions with Family / Patient: Mother at bedside    Time Spent for Care: 30 minutes  More than 50% of total time spent on counseling and coordination of care as described above  Current Length of Stay: 6 day(s)    Current Patient Status: Inpatient   Certification Statement: The patient will continue to require additional inpatient hospital stay due to acute hypoxic respiratory failure    Discharge Plan: home when off of oxygen      Code Status: Level 1 - Full Code      Subjective:   Non verbal    Objective:     Vitals:   Temp (24hrs), Av 7 °F (36 5 °C), Min:97 7 °F (36 5 °C), Max:97 7 °F (36 5 °C)    Temp:  [97 7 °F (36 5 °C)] 97 7 °F (36 5 °C)  HR:  [77-88] 86  Resp:  [20] 20  BP: (95-98)/(54-55) 98/55  SpO2:  [88 %-97 %] 95 %  Body mass index is 30 09 kg/m²  Input and Output Summary (last 24 hours): Intake/Output Summary (Last 24 hours) at 2021 1121  Last data filed at 2021 2219  Gross per 24 hour   Intake --   Output 660 ml   Net -660 ml       Physical Exam:     Physical Exam  Vitals signs and nursing note reviewed  HENT:      Head: Normocephalic  Mouth/Throat:      Mouth: Mucous membranes are moist    Eyes:      General: No scleral icterus  Conjunctiva/sclera: Conjunctivae normal    Neck:      Musculoskeletal: Normal range of motion and neck supple  Pulmonary:      Effort: Pulmonary effort is normal  No respiratory distress  Breath sounds: Normal breath sounds  Abdominal:      General: Bowel sounds are normal  There is no distension  Palpations: Abdomen is soft  Musculoskeletal:      Left lower leg: No edema  Skin:     General: Skin is warm  Neurological:      Mental Status: She is alert         Additional Data: Labs:    Results from last 7 days   Lab Units 04/15/21  0753  04/11/21  1501   WBC Thousand/uL 6 11   < > 5 22   HEMOGLOBIN g/dL 12 6   < > 13 1   HEMATOCRIT % 38 4   < > 40 3   PLATELETS Thousands/uL 182   < > 157   BANDS PCT %  --   --  5   LYMPHO PCT %  --   --  18   MONO PCT %  --   --  5   EOS PCT %  --   --  0    < > = values in this interval not displayed  Results from last 7 days   Lab Units 04/15/21  0753  04/11/21  1500   SODIUM mmol/L 141   < > 142   POTASSIUM mmol/L 3 5   < > 3 7   CHLORIDE mmol/L 106   < > 105   CO2 mmol/L 30   < > 29   BUN mg/dL 13   < > 20   CREATININE mg/dL 1 16   < > 1 18   ANION GAP mmol/L 5   < > 8   CALCIUM mg/dL 8 0*   < > 7 7*   ALBUMIN g/dL  --   --  3 1*   TOTAL BILIRUBIN mg/dL  --   --  0 62   ALK PHOS U/L  --   --  183*   ALT U/L  --   --  67   AST U/L  --   --  57*   GLUCOSE RANDOM mg/dL 90   < > 147*    < > = values in this interval not displayed  Results from last 7 days   Lab Units 04/11/21  1541   INR  1 02         Results from last 7 days   Lab Units 04/12/21  0439   HEMOGLOBIN A1C % 5 2     Results from last 7 days   Lab Units 04/16/21  0723 04/15/21  0753 04/12/21  0439 04/11/21  1825 04/11/21  1541   LACTIC ACID mmol/L  --   --   --  1 5 2 7*   PROCALCITONIN ng/ml <0 05 <0 05 <0 05  --  <0 05       Recent Cultures (last 7 days):     Results from last 7 days   Lab Units 04/11/21  1536 04/11/21  1501   BLOOD CULTURE  No Growth After 5 Days  No Growth After 5 Days         Last 24 Hours Medication List:   Current Facility-Administered Medications   Medication Dose Route Frequency Provider Last Rate    clonazePAM  1 mg Oral BID Radha Fall MD      donepezil  5 mg Oral HS Radha Fall MD      escitalopram  10 mg Oral Daily Radha Fall MD      escitalopram  20 mg Oral HS Radha Fall MD      LORazepam  1 mg Oral Q8H PRN Radha Fall MD      multivitamin-minerals  1 tablet Oral Daily Dea Cee MD      OLANZapine  10 mg Oral HS Navin Rival JASMINE Gallegos MD      OLANZapine  5 mg Oral BID Raymond Jackson MD      polyethylene glycol  17 g Oral Daily Kelsey Ingram MD      QUEtiapine  25 mg Oral Daily Raymond Jackson MD      QUEtiapine  50 mg Oral HS Raymond Jackson MD      simethicone  80 mg Oral Q6H PRN Kelsey Ingram MD      sodium chloride  1 spray Each Nare Q1H PRN Miguel Valentin MD          Today, Patient Was Seen By: Zo Barnard MD    ** Please Note: Dictation voice to text software may have been used in the creation of this document   **

## 2021-04-17 NOTE — DISCHARGE INSTRUCTIONS
Aspiration Precautions   WHAT YOU NEED TO KNOW:   Aspiration means that foods or fluids get into your airway  This can lead to trouble breathing or lung infections such as pneumonia  Aspiration precautions are practices that help prevent these problems  DISCHARGE INSTRUCTIONS:   Return to the emergency department if:   · You have chest pain  · You have shortness of breath  · You have signs or symptoms of dehydration, such as increased thirst, dark urine, or little or no urine  Contact your healthcare provider if:   · You have a cough, chills, or a fever  · You cough or choke before, during, or after you eat or drink  · You feel like you have to clear your throat after you eat or drink  · You lose weight without trying  · You have questions or concerns about your condition or care  Prevent aspiration:   · Eat in a chair or sit upright while you eat  This will help prevent choking  Stay upright for 45 minutes to 1 hour after you eat or drink  · Eat small amounts slowly  Do not eat or drink with a straw  Take small bites and chew well before you swallow  · Avoid distractions while you eat  Keep the radio and TV turned off during meals  Do not try to talk to others while you eat  · Make sure your dentures fit correctly  This will help you chew food into pieces that are easier to swallow  · Limit spicy foods and caffeine  These may cause reflux  Reflux is the movement of foods and fluids from your stomach into your esophagus  This could increase the risk that foods or fluids will also move into your airway  · Drink water with your meals  Water will help rinse food out of your mouth  This will decrease the risk that food will move into your airway  · Do not smoke  Nicotine and other chemicals in cigarettes and cigars can damage your esophagus and cause trouble swallowing  Ask your healthcare provider for information if you currently smoke and need help to quit  E-cigarettes or smokeless tobacco still contain nicotine  Talk to your healthcare provider before you use these products  What you need to know about nutrition and aspiration:  Your healthcare provider may show you how to thicken liquids or soften foods  Thickened liquids and soft foods are easier to swallow  A registered dietitian can help you plan your meals:  · Puree your foods as directed  This will help remove chunks or lumps  You can add gravy, sauce, vegetable juice, milk, or half and half to foods before you blend them  Your food should be the same consistency as pudding after you puree it  If your food is too thin after you puree it, thicken it as directed  The following are examples of foods that puree well into a pudding consistency:     ? Cream of wheat with small amounts of milk    ? Moistened breads, pancakes, [place] pastries, or muffins    ? Well-cooked pasta, noodles, or rice    ? Cooked vegetables, tomato sauce, or cooked potatoes without skin    ? Casseroles, eggs, or cooked pureed meats    ? Margarine, sour cream, smooth cheese sauces, or strained gravy    · Thicken your foods and drinks as directed  Your food and drinks should be thickened to the consistency of pudding  You can add flour, cornstarch, or potato flakes, or thickening products to thicken your foods or drinks  Follow directions on the package when you add thickening products to your food or drinks  Your healthcare provider will give you a complete list of foods and drinks that need to be thickened  The following are examples of foods and drinks that should be thickened:     ? Milk, milkshakes, nutritional shakes, or sherbet    ? Juices without pulp or gelatin    ? Coffee, tea, or soda    ? Alcoholic beverages    · Keep a food diary  Write down everything you eat  Take the diary to your follow-up visits  This information will help your healthcare provider decide if you are getting enough nutrition      Follow up with your healthcare provider as directed:  Write down your questions so you remember to ask them during your visits  © Copyright [address] Information is for End User's use only and may not be sold, redistributed or otherwise used for commercial purposes  All illustrations and images included in CareNotes® are the copyrighted property of A D A M [employer] Inc  or Elo Collins  The above information is an  only  It is not intended as medical advice for individual conditions or treatments  Talk to your doctor, nurse or pharmacist before following any medical regimen to see if it is safe and effective for you

## 2021-04-18 VITALS
HEIGHT: 55 IN | TEMPERATURE: 96.5 F | SYSTOLIC BLOOD PRESSURE: 89 MMHG | OXYGEN SATURATION: 88 % | HEART RATE: 64 BPM | DIASTOLIC BLOOD PRESSURE: 53 MMHG | WEIGHT: 122.58 LBS | BODY MASS INDEX: 28.37 KG/M2 | RESPIRATION RATE: 16 BRPM

## 2021-04-18 PROCEDURE — 99238 HOSP IP/OBS DSCHRG MGMT 30/<: CPT | Performed by: INTERNAL MEDICINE

## 2021-04-18 RX ORDER — ECHINACEA PURPUREA EXTRACT 125 MG
1 TABLET ORAL AS NEEDED
Qty: 15 ML | Refills: 0 | Status: SHIPPED | OUTPATIENT
Start: 2021-04-18 | End: 2022-03-10

## 2021-04-18 RX ORDER — FOLIC ACID/MULTIVIT,IRON,MINER .4-18-35
1 TABLET,CHEWABLE ORAL DAILY
Refills: 0
Start: 2021-04-18 | End: 2022-03-10

## 2021-04-18 RX ADMIN — CLONAZEPAM 1 MG: 1 TABLET ORAL at 08:49

## 2021-04-18 RX ADMIN — QUETIAPINE FUMARATE 25 MG: 25 TABLET ORAL at 11:49

## 2021-04-18 RX ADMIN — ESCITALOPRAM OXALATE 10 MG: 10 TABLET ORAL at 08:49

## 2021-04-18 RX ADMIN — Medication 1 TABLET: at 08:49

## 2021-04-18 RX ADMIN — OLANZAPINE 5 MG: 2.5 TABLET, FILM COATED ORAL at 08:49

## 2021-04-21 ENCOUNTER — DOCUMENTATION (OUTPATIENT)
Dept: SOCIAL WORK | Facility: HOSPITAL | Age: 46
End: 2021-04-21

## 2021-04-21 NOTE — PROGRESS NOTES
Admission Report at [facility] of [address] [facility] has admitted your patient to [address] [place] [address] service with the following disciplines:       SN  Response needed, please respond via Tiger Text  Primary focus of home health care: Pulmonary assess  Patient stated goals of care: Have more help in home  Anticipated visit pattern: 2w2 1w2  and next visit date: 4 22 21 CP assess  Request for additional disciplines: Please Tiger text Fransisco HASSAN a verbal order for a VNA Medical Social Worker 1 week 1 eval and treat for in home ADL services options and long term planning  Used to have [facility] but hasn't seen in years and mother in law is haing care giver role strain  Please TigMarina Levin RN a Verbal order for [facility] 2week3 for assistance with bathing and dressing  Needs follow up physician appointments scheduled: TCM with PCP  Potential barriers to goal achievement: Nonverbal patient   Mother in law refused PT OT      Thank you for allowing us to participate in the care of your patient        Marilyn Solitario RN

## 2021-04-21 NOTE — PROGRESS NOTES
This  patient   has  severe  Down syndrome , with  behavior  problems  Never  seen  by me  as  a  patient  All her meds  have been  prescribed  by  the  doctors  who  took  care or  her admission  at  [facility]     Dr Tuyet Lacey

## 2021-04-21 NOTE — PROGRESS NOTES
Admission Report at [facility]'s [facility] has admitted your patient to [address] [place] [address] service with the following disciplines:      SN  Response needed, please respond via Tiger Text  Primary focus of home health care: Pulmonary assess  Patient stated goals of care: Have more help in home  Anticipated visit pattern: 2w2 1w2  and next visit date: 4 22 21 CP assess  Significant clinical findings: Please Tiger text Rebecca Greene RN VNA  if ok to continue contraindicated medications as follows: The use of quetiapine in patients maintained on agents that prolong the QTc interval, Lexapro,  may result in potentially life-threatening cardiac arrhythmias, including torsades de pointes  Concurrent use of intramuscular olanzapine with a parenteral benzodiazepine, Lorazepam, may result in excessive sedation, cardiorespiratory depression, and death  MED ISSUES_Missing multivitamin and taking donepesil 5 mg in am and 10 mg in evening  Escitalopram 10 mg BID  Lorazepam 1 mg 0 5 mg TID and quetiapine 25 mg at bedtime and 12 5 mg in afternoon  Gets prefilled bubble packs from [employer]  Request for additional disciplines: Please Tiger text Rebecca DUKESA a verbal order for a VNA Medical Social Worker 1 week 1 eval and treat for in home ADL services options and long term planning  Used to have [facility] but hasn't seen in years and mother in law is haing care giver role strain  Please Jame Tanner RN a Verbal order for [facility] 2week3 for assistance with bathing and dressing  Needs follow up physician appointments scheduled: TCM with PCP  Potential barriers to goal achievement: Nonverbal patient  Mother in law refused PT OT  Thank you for allowing us to participate in the care of your patient        Dalton Knott RN

## 2021-05-04 ENCOUNTER — OFFICE VISIT (OUTPATIENT)
Dept: INTERNAL MEDICINE CLINIC | Facility: CLINIC | Age: 46
End: 2021-05-04
Payer: MEDICARE

## 2021-05-04 VITALS
HEART RATE: 56 BPM | BODY MASS INDEX: 27.31 KG/M2 | HEIGHT: 55 IN | TEMPERATURE: 97.1 F | OXYGEN SATURATION: 96 % | WEIGHT: 118 LBS | SYSTOLIC BLOOD PRESSURE: 105 MMHG | DIASTOLIC BLOOD PRESSURE: 62 MMHG

## 2021-05-04 DIAGNOSIS — Q90.9 DOWN SYNDROME: Primary | ICD-10-CM

## 2021-05-04 PROCEDURE — 99213 OFFICE O/P EST LOW 20 MIN: CPT | Performed by: INTERNAL MEDICINE

## 2021-05-04 NOTE — PROGRESS NOTES
Assessment/Plan:    No problem-specific Assessment & Plan notes found for this encounter  Diagnoses and all orders for this visit:    Down syndrome          Subjective:      Patient ID: Royal Calix is a 50 y o  female  Physical    In   A   Patient  With Down syndrome    No verbal  Communication  Due to  Severe  MR        The following portions of the patient's history were reviewed and updated as appropriate: allergies, current medications, past family history, past medical history, past social history, past surgical history, and problem list     Review of Systems   Constitutional: Negative  HENT: Negative for dental problem, drooling, ear discharge and ear pain  Eyes: Negative for discharge, redness and itching  Respiratory: Negative for apnea, cough and wheezing  Cardiovascular: Negative for chest pain and palpitations  Gastrointestinal: Negative for abdominal pain, blood in stool, diarrhea and vomiting  Endocrine: Negative for polydipsia, polyphagia and polyuria  Genitourinary: Negative for decreased urine volume, dysuria and frequency  Musculoskeletal: Negative for arthralgias, myalgias and neck stiffness  Skin: Negative for pallor and wound  Allergic/Immunologic: Negative for environmental allergies and food allergies  Neurological: Negative for facial asymmetry, light-headedness, numbness and headaches  Hematological: Negative for adenopathy  Does not bruise/bleed easily  Psychiatric/Behavioral: Negative for agitation, behavioral problems and confusion  Objective:      /62 (BP Location: Left arm, Patient Position: Sitting, Cuff Size: Standard)   Pulse 56   Temp (!) 97 1 °F (36 2 °C)   Ht 4' 6" (1 372 m)   Wt 53 5 kg (118 lb)   SpO2 96%   BMI 28 45 kg/m²          Physical Exam  Constitutional:       Appearance: Normal appearance  She is obese  HENT:      Head: Normocephalic        Nose: Nose normal       Mouth/Throat:      Mouth: Mucous membranes are moist    Eyes:      Pupils: Pupils are equal, round, and reactive to light  Neck:      Musculoskeletal: Neck supple  Cardiovascular:      Rate and Rhythm: Regular rhythm  Heart sounds: Normal heart sounds  Pulmonary:      Breath sounds: Normal breath sounds  Abdominal:      Palpations: Abdomen is soft  Musculoskeletal:         General: No swelling  Skin:     General: Skin is warm  Neurological:      General: No focal deficit present  Mental Status: She is alert and oriented to person, place, and time  Psychiatric:         Mood and Affect: Mood normal        Down  Syndrome  ,  Physical, nothing  Acute   , only  Sever  MR  From  Down's syndrome      MIAH Bhagat MD

## 2022-03-09 ENCOUNTER — ANESTHESIA EVENT (OUTPATIENT)
Dept: PERIOP | Facility: HOSPITAL | Age: 47
End: 2022-03-09
Payer: MEDICARE

## 2022-03-09 NOTE — ANESTHESIA PREPROCEDURE EVALUATION
H/o down syndrome, unable to get pre-op labs  Discussed with surgery and reviewed patient chart, low yield preop labs given normal labs within the year  Will obtain preop EKG given Down syndrome and rule out/assess cardiac status  Pt asymptomatic at this time  Procedure:  REMOVE CHEEK & LOWER EYELID  LESIONS (Right Face)    Relevant Problems   NEURO/PSYCH   (+) Anxiety   (+) Dementia (HCC)      PULMONARY   (+) Acute respiratory failure with hypoxia (HCC)        Physical Exam    Airway    Mallampati score: IV         Dental       Cardiovascular  Rate: normal,     Pulmonary  Pulmonary exam normal     Other Findings  Per pt denies anything remaining that is loose or removeable      Anesthesia Plan  ASA Score- 2     Anesthesia Type- general with ASA Monitors  Additional Monitors:   Airway Plan: LMA  Comment: Mac vs GA  Plan Factors-Exercise tolerance (METS): >4 METS  Chart reviewed  Patient summary reviewed  Patient is not a current smoker  Induction- intravenous  Postoperative Plan- Plan for postoperative opioid use  Informed Consent- Anesthetic plan and risks discussed with patient  I personally reviewed this patient with the CRNA  Discussed and agreed on the Anesthesia Plan with the CRNA  Alexa Green

## 2022-03-16 ENCOUNTER — ANESTHESIA (OUTPATIENT)
Dept: PERIOP | Facility: HOSPITAL | Age: 47
End: 2022-03-16
Payer: MEDICARE

## 2022-03-16 ENCOUNTER — HOSPITAL ENCOUNTER (OUTPATIENT)
Facility: HOSPITAL | Age: 47
Setting detail: OUTPATIENT SURGERY
Discharge: HOME | End: 2022-03-16
Attending: PLASTIC SURGERY | Admitting: PLASTIC SURGERY
Payer: MEDICARE

## 2022-03-16 VITALS
SYSTOLIC BLOOD PRESSURE: 100 MMHG | RESPIRATION RATE: 20 BRPM | HEIGHT: 55 IN | WEIGHT: 120 LBS | HEART RATE: 76 BPM | BODY MASS INDEX: 27.77 KG/M2 | TEMPERATURE: 97.6 F | DIASTOLIC BLOOD PRESSURE: 66 MMHG | OXYGEN SATURATION: 91 %

## 2022-03-16 DIAGNOSIS — D49.2 NEOPLASM OF UNSPECIFIED BEHAVIOR OF BONE, SOFT TISSUE, AND SKIN: ICD-10-CM

## 2022-03-16 PROCEDURE — 88305 TISSUE EXAM BY PATHOLOGIST: CPT | Performed by: PATHOLOGY

## 2022-03-16 RX ORDER — DEXMEDETOMIDINE HYDROCHLORIDE 100 UG/ML
INJECTION, SOLUTION INTRAVENOUS AS NEEDED
Status: DISCONTINUED | OUTPATIENT
Start: 2022-03-16 | End: 2022-03-16

## 2022-03-16 RX ORDER — ONDANSETRON 2 MG/ML
4 INJECTION INTRAMUSCULAR; INTRAVENOUS ONCE AS NEEDED
Status: DISCONTINUED | OUTPATIENT
Start: 2022-03-16 | End: 2022-03-16 | Stop reason: HOSPADM

## 2022-03-16 RX ORDER — ONDANSETRON 4 MG/1
4 TABLET, ORALLY DISINTEGRATING ORAL EVERY 6 HOURS PRN
Status: DISCONTINUED | OUTPATIENT
Start: 2022-03-16 | End: 2022-03-16 | Stop reason: HOSPADM

## 2022-03-16 RX ORDER — PROPOFOL 10 MG/ML
INJECTION, EMULSION INTRAVENOUS AS NEEDED
Status: DISCONTINUED | OUTPATIENT
Start: 2022-03-16 | End: 2022-03-16

## 2022-03-16 RX ORDER — SODIUM CHLORIDE, SODIUM LACTATE, POTASSIUM CHLORIDE, CALCIUM CHLORIDE 600; 310; 30; 20 MG/100ML; MG/100ML; MG/100ML; MG/100ML
100 INJECTION, SOLUTION INTRAVENOUS CONTINUOUS
Status: DISCONTINUED | OUTPATIENT
Start: 2022-03-16 | End: 2022-03-16 | Stop reason: HOSPADM

## 2022-03-16 RX ORDER — BALANCED SALT SOLUTION 6.4; .75; .48; .3; 3.9; 1.7 MG/ML; MG/ML; MG/ML; MG/ML; MG/ML; MG/ML
SOLUTION OPHTHALMIC AS NEEDED
Status: DISCONTINUED | OUTPATIENT
Start: 2022-03-16 | End: 2022-03-16 | Stop reason: HOSPADM

## 2022-03-16 RX ORDER — SODIUM CHLORIDE, SODIUM LACTATE, POTASSIUM CHLORIDE, CALCIUM CHLORIDE 600; 310; 30; 20 MG/100ML; MG/100ML; MG/100ML; MG/100ML
75 INJECTION, SOLUTION INTRAVENOUS CONTINUOUS
Status: DISCONTINUED | OUTPATIENT
Start: 2022-03-16 | End: 2022-03-16 | Stop reason: HOSPADM

## 2022-03-16 RX ORDER — GLYCOPYRROLATE 0.2 MG/ML
INJECTION INTRAMUSCULAR; INTRAVENOUS AS NEEDED
Status: DISCONTINUED | OUTPATIENT
Start: 2022-03-16 | End: 2022-03-16

## 2022-03-16 RX ORDER — HYDROMORPHONE HCL/PF 1 MG/ML
0.25 SYRINGE (ML) INJECTION
Status: DISCONTINUED | OUTPATIENT
Start: 2022-03-16 | End: 2022-03-16 | Stop reason: HOSPADM

## 2022-03-16 RX ORDER — MIDAZOLAM HYDROCHLORIDE 2 MG/ML
14 SYRUP ORAL ONCE
Status: COMPLETED | OUTPATIENT
Start: 2022-03-16 | End: 2022-03-16

## 2022-03-16 RX ORDER — MAGNESIUM HYDROXIDE 1200 MG/15ML
LIQUID ORAL AS NEEDED
Status: DISCONTINUED | OUTPATIENT
Start: 2022-03-16 | End: 2022-03-16 | Stop reason: HOSPADM

## 2022-03-16 RX ORDER — PROMETHAZINE HYDROCHLORIDE 25 MG/ML
12.5 INJECTION, SOLUTION INTRAMUSCULAR; INTRAVENOUS
Status: DISCONTINUED | OUTPATIENT
Start: 2022-03-16 | End: 2022-03-16 | Stop reason: HOSPADM

## 2022-03-16 RX ORDER — DEXAMETHASONE SODIUM PHOSPHATE 4 MG/ML
INJECTION, SOLUTION INTRA-ARTICULAR; INTRALESIONAL; INTRAMUSCULAR; INTRAVENOUS; SOFT TISSUE AS NEEDED
Status: DISCONTINUED | OUTPATIENT
Start: 2022-03-16 | End: 2022-03-16

## 2022-03-16 RX ORDER — LIDOCAINE HYDROCHLORIDE AND EPINEPHRINE 5; 5 MG/ML; UG/ML
INJECTION, SOLUTION INFILTRATION; PERINEURAL AS NEEDED
Status: DISCONTINUED | OUTPATIENT
Start: 2022-03-16 | End: 2022-03-16 | Stop reason: HOSPADM

## 2022-03-16 RX ORDER — SODIUM CHLORIDE, SODIUM LACTATE, POTASSIUM CHLORIDE, CALCIUM CHLORIDE 600; 310; 30; 20 MG/100ML; MG/100ML; MG/100ML; MG/100ML
INJECTION, SOLUTION INTRAVENOUS CONTINUOUS PRN
Status: DISCONTINUED | OUTPATIENT
Start: 2022-03-16 | End: 2022-03-16

## 2022-03-16 RX ORDER — NEOMYCIN SULFATE, POLYMYXIN B SULFATE AND BACITRACIN ZINC 3.5; 10000; 4 MG/G; [USP'U]/G; [USP'U]/G
OINTMENT OPHTHALMIC AS NEEDED
Status: DISCONTINUED | OUTPATIENT
Start: 2022-03-16 | End: 2022-03-16 | Stop reason: HOSPADM

## 2022-03-16 RX ORDER — ONDANSETRON 2 MG/ML
INJECTION INTRAMUSCULAR; INTRAVENOUS AS NEEDED
Status: DISCONTINUED | OUTPATIENT
Start: 2022-03-16 | End: 2022-03-16

## 2022-03-16 RX ORDER — FENTANYL CITRATE/PF 50 MCG/ML
50 SYRINGE (ML) INJECTION
Status: DISCONTINUED | OUTPATIENT
Start: 2022-03-16 | End: 2022-03-16 | Stop reason: HOSPADM

## 2022-03-16 RX ADMIN — DEXMEDETOMIDINE HYDROCHLORIDE 8 MCG: 100 INJECTION, SOLUTION INTRAVENOUS at 12:02

## 2022-03-16 RX ADMIN — DEXAMETHASONE SODIUM PHOSPHATE 4 MG: 4 INJECTION, SOLUTION INTRAMUSCULAR; INTRAVENOUS at 11:30

## 2022-03-16 RX ADMIN — PROPOFOL 100 MG: 10 INJECTION, EMULSION INTRAVENOUS at 11:28

## 2022-03-16 RX ADMIN — SODIUM CHLORIDE, SODIUM LACTATE, POTASSIUM CHLORIDE, AND CALCIUM CHLORIDE: .6; .31; .03; .02 INJECTION, SOLUTION INTRAVENOUS at 11:28

## 2022-03-16 RX ADMIN — ONDANSETRON 4 MG: 2 INJECTION INTRAMUSCULAR; INTRAVENOUS at 11:36

## 2022-03-16 RX ADMIN — GLYCOPYRROLATE 0.2 MG: 0.2 INJECTION, SOLUTION INTRAMUSCULAR; INTRAVENOUS at 11:28

## 2022-03-16 RX ADMIN — MIDAZOLAM HYDROCHLORIDE 14 MG: 2 SYRUP ORAL at 09:06

## 2022-03-16 NOTE — NURSING NOTE
Returned from PACU, sitting up in be, family present to assist with care  VSS, tolerating po fluids and crackers without incident   Discharge instructions given to family

## 2022-03-16 NOTE — ANESTHESIA POSTPROCEDURE EVALUATION
Post-Op Assessment Note    CV Status:  Stable    Pain management: satisfactory to patient     Mental Status:  Alert, awake and sleepy   Hydration Status:  Stable   PONV Controlled:  None   Airway Patency:  Patent      Post Op Vitals Reviewed: Yes      Staff: CRNA, Anesthesiologist         No complications documented      BP   94/69   Temp (!) (P) 97 1 °F (36 2 °C) (03/16/22 1214)    Pulse 8   Resp (P) 20 (03/16/22 1214)    SpO2 (P) 91 % (03/16/22 1214)

## 2022-03-16 NOTE — OP NOTE
OPERATIVE REPORT  PATIENT NAME: Madisyn Mattson    :  1975  MRN: [id number]  Pt Location:  OR ROOM 08    SURGERY DATE: 3/16/2022    Surgeon(s) and Role:     * Marek Amaral MD - Primary    Preop and postoperative Diagnosis:  Right cheek and right lower eyelid lesions    Operative Procedure(s) (LRB):  REMOVE CHEEK & LOWER EYELID  LESIONS (Right)     Operative history:  She has a warty like growth of the right lower eyelid measuring about 10 by 12 mm in size  This was removed with a transverse ellipse taking 1 mm margins  No ectropion 14 was cause of the right lower eyelid  She also had a keratotic lesion consistent with a seborrheic keratosis of the right cheek area that was somewhat teardrop and shape fleeing about 6 x 9 mm in size as large as its largest width  This was removed with oblique ellipse taking 1 mm margins  Operative procedure: The patient was taken to the operating placed supine the operating table  She was prepped and draped in usual fashion  Anesthesia had to be general via laryngeal mask anesthesia to be able to to make sure she did move during the surgery due to her other medical problems  As mentioned elliptical excision of the right lower eyelid as well as the right cheek lesions were performed  Hemostasis in each areas was achieved with the bipolar  Each area was closed separately with 4-0 nylon interrupted vertical mattress sutures  Antibiotic ointment dressings were applied  The patient tolerated procedure well taken to recovery in good condition      Specimen(s):  ID Type Source Tests Collected by Time Destination   1 : RIGHT CHEEK LESION Tissue Lesion TISSUE EXAM Marek Amaral MD 3/16/2022 1144    2 : RIGHT LOWER EYELID LESION Tissue Lesion TISSUE EXAM Marek Amaral MD 3/16/2022 1145              SIGNATURE: Marek Amaral MD  DATE: 2022  TIME: 12:15 PM

## 2022-03-16 NOTE — DISCHARGE INSTR - AVS FIRST PAGE
1  Return 1 [facility] in 2 weeks for suture removal  2  May shower  3   Neosporin for 2 days to suture lines right face

## 2022-03-16 NOTE — INTERVAL H&P NOTE
H&P reviewed  After examining the patient I find no changes in the patients condition since the H&P had been written      Vitals:    03/16/22 0811   BP: 101/62   Pulse: (!) 54   Resp: 18   Temp: (!) 96 °F (35 6 °C)

## 2022-08-24 DIAGNOSIS — F32.A DEPRESSION: Primary | ICD-10-CM

## 2022-08-24 RX ORDER — OLANZAPINE 5 MG/1
5 TABLET ORAL 2 TIMES DAILY
Qty: 180 TABLET | Refills: 1 | Status: SHIPPED | OUTPATIENT
Start: 2022-08-24

## 2022-08-25 ENCOUNTER — TELEPHONE (OUTPATIENT)
Dept: INTERNAL MEDICINE CLINIC | Facility: CLINIC | Age: 47
End: 2022-08-25

## 2022-08-25 NOTE — TELEPHONE ENCOUNTER
Pharmacy needs clarification regarding the script for Olanzapine that was sent in  It was ordered as taking 1 tablet (5mg) by mouth 2 times a day  Patient was originally taking 1 tablet (10mg) at bedtime  Is this new script replacing the previous one? Or is this new script in addition to the previous script of 1 tablet (10mg) at bedtime?      Please advise

## 2022-09-06 ENCOUNTER — TELEPHONE (OUTPATIENT)
Dept: PSYCHIATRY | Facility: CLINIC | Age: 47
End: 2022-09-06

## 2022-11-02 ENCOUNTER — HOSPITAL ENCOUNTER (OUTPATIENT)
Facility: HOSPITAL | Age: 47
Setting detail: OBSERVATION
Discharge: HOME | End: 2022-11-04
Attending: EMERGENCY MEDICINE | Admitting: HOSPITALIST

## 2022-11-02 DIAGNOSIS — R93.2 ABNORMAL CT SCAN, GALLBLADDER: Primary | ICD-10-CM

## 2022-11-02 DIAGNOSIS — F03.90 DEMENTIA (HCC): ICD-10-CM

## 2022-11-02 DIAGNOSIS — R79.89 ELEVATED LFTS: ICD-10-CM

## 2022-11-02 DIAGNOSIS — Q90.9 DOWN SYNDROME: ICD-10-CM

## 2022-11-02 DIAGNOSIS — R71.8 ELEVATED MCV: ICD-10-CM

## 2022-11-02 DIAGNOSIS — F41.9 ANXIETY: ICD-10-CM

## 2022-11-03 ENCOUNTER — APPOINTMENT (OUTPATIENT)
Dept: NUCLEAR MEDICINE | Facility: HOSPITAL | Age: 47
End: 2022-11-03

## 2022-11-03 ENCOUNTER — APPOINTMENT (EMERGENCY)
Dept: CT IMAGING | Facility: HOSPITAL | Age: 47
End: 2022-11-03

## 2022-11-03 ENCOUNTER — TELEPHONE (OUTPATIENT)
Dept: NUCLEAR MEDICINE | Facility: HOSPITAL | Age: 47
End: 2022-11-03

## 2022-11-03 ENCOUNTER — APPOINTMENT (EMERGENCY)
Dept: ULTRASOUND IMAGING | Facility: HOSPITAL | Age: 47
End: 2022-11-03

## 2022-11-03 PROBLEM — R10.9 ABDOMINAL PAIN: Status: ACTIVE | Noted: 2022-11-03

## 2022-11-03 LAB
ALBUMIN SERPL BCP-MCNC: 3.3 G/DL (ref 3.5–5)
ALP SERPL-CCNC: 351 U/L (ref 34–104)
ALT SERPL W P-5'-P-CCNC: 153 U/L (ref 7–52)
ANION GAP SERPL CALCULATED.3IONS-SCNC: 7 MMOL/L (ref 4–13)
AST SERPL W P-5'-P-CCNC: 194 U/L (ref 13–39)
BACTERIA UR QL AUTO: NORMAL /HPF
BASOPHILS # BLD AUTO: 0.06 THOUSANDS/ÂΜL (ref 0–0.1)
BASOPHILS NFR BLD AUTO: 1 % (ref 0–1)
BILIRUB SERPL-MCNC: 0.86 MG/DL (ref 0.2–1)
BILIRUB UR QL STRIP: NEGATIVE
BUN SERPL-MCNC: 20 MG/DL (ref 5–25)
CALCIUM ALBUM COR SERPL-MCNC: 8.6 MG/DL (ref 8.3–10.1)
CALCIUM SERPL-MCNC: 8 MG/DL (ref 8.4–10.2)
CHLORIDE SERPL-SCNC: 106 MMOL/L (ref 96–108)
CLARITY UR: CLEAR
CO2 SERPL-SCNC: 27 MMOL/L (ref 21–32)
COLOR UR: ABNORMAL
CREAT SERPL-MCNC: 1.17 MG/DL (ref 0.6–1.3)
EOSINOPHIL # BLD AUTO: 0.05 THOUSAND/ÂΜL (ref 0–0.61)
EOSINOPHIL NFR BLD AUTO: 1 % (ref 0–6)
ERYTHROCYTE [DISTWIDTH] IN BLOOD BY AUTOMATED COUNT: 13.5 % (ref 11.6–15.1)
GFR SERPL CREATININE-BSD FRML MDRD: 54 ML/MIN/1.73SQ M
GLUCOSE SERPL-MCNC: 109 MG/DL (ref 65–140)
GLUCOSE UR STRIP-MCNC: NEGATIVE MG/DL
HCT VFR BLD AUTO: 40.5 % (ref 34.8–46.1)
HGB BLD-MCNC: 13.1 G/DL (ref 11.5–15.4)
HGB UR QL STRIP.AUTO: ABNORMAL
IMM GRANULOCYTES # BLD AUTO: 0.04 THOUSAND/UL (ref 0–0.2)
IMM GRANULOCYTES NFR BLD AUTO: 0 % (ref 0–2)
KETONES UR STRIP-MCNC: NEGATIVE MG/DL
LEUKOCYTE ESTERASE UR QL STRIP: NEGATIVE
LIPASE SERPL-CCNC: 205 U/L (ref 11–82)
LYMPHOCYTES # BLD AUTO: 0.35 THOUSANDS/ÂΜL (ref 0.6–4.47)
LYMPHOCYTES NFR BLD AUTO: 3 % (ref 14–44)
MCH RBC QN AUTO: 33 PG (ref 26.8–34.3)
MCHC RBC AUTO-ENTMCNC: 32.3 G/DL (ref 31.4–37.4)
MCV RBC AUTO: 102 FL (ref 82–98)
MONOCYTES # BLD AUTO: 0.57 THOUSAND/ÂΜL (ref 0.17–1.22)
MONOCYTES NFR BLD AUTO: 5 % (ref 4–12)
NEUTROPHILS # BLD AUTO: 9.46 THOUSANDS/ÂΜL (ref 1.85–7.62)
NEUTS SEG NFR BLD AUTO: 90 % (ref 43–75)
NITRITE UR QL STRIP: NEGATIVE
NON-SQ EPI CELLS URNS QL MICRO: NORMAL /HPF
NRBC BLD AUTO-RTO: 0 /100 WBCS
PH UR STRIP.AUTO: 5 [PH]
PLATELET # BLD AUTO: 189 THOUSANDS/UL (ref 149–390)
PMV BLD AUTO: 10.9 FL (ref 8.9–12.7)
POTASSIUM SERPL-SCNC: 3.9 MMOL/L (ref 3.5–5.3)
PROT SERPL-MCNC: 6.7 G/DL (ref 6.4–8.4)
PROT UR STRIP-MCNC: NEGATIVE MG/DL
RBC # BLD AUTO: 3.97 MILLION/UL (ref 3.81–5.12)
RBC #/AREA URNS AUTO: NORMAL /HPF
SODIUM SERPL-SCNC: 140 MMOL/L (ref 135–147)
SP GR UR STRIP.AUTO: 1.01 (ref 1–1.03)
UROBILINOGEN UR STRIP-ACNC: <2 MG/DL
WBC # BLD AUTO: 10.53 THOUSAND/UL (ref 4.31–10.16)
WBC #/AREA URNS AUTO: NORMAL /HPF

## 2022-11-03 RX ORDER — LORAZEPAM 2 MG/ML
2 INJECTION INTRAMUSCULAR ONCE
Status: DISCONTINUED | OUTPATIENT
Start: 2022-11-03 | End: 2022-11-03

## 2022-11-03 RX ORDER — LORAZEPAM 2 MG/ML
1 INJECTION INTRAMUSCULAR ONCE AS NEEDED
Status: DISCONTINUED | OUTPATIENT
Start: 2022-11-03 | End: 2022-11-04 | Stop reason: HOSPADM

## 2022-11-03 RX ORDER — OLANZAPINE 2.5 MG/1
5 TABLET ORAL 2 TIMES DAILY
Status: DISCONTINUED | OUTPATIENT
Start: 2022-11-03 | End: 2022-11-04 | Stop reason: HOSPADM

## 2022-11-03 RX ORDER — ONDANSETRON 2 MG/ML
4 INJECTION INTRAMUSCULAR; INTRAVENOUS EVERY 6 HOURS PRN
Status: DISCONTINUED | OUTPATIENT
Start: 2022-11-03 | End: 2022-11-04 | Stop reason: HOSPADM

## 2022-11-03 RX ORDER — OLANZAPINE 10 MG/1
10 INJECTION, POWDER, LYOPHILIZED, FOR SOLUTION INTRAMUSCULAR ONCE
Status: COMPLETED | OUTPATIENT
Start: 2022-11-03 | End: 2022-11-03

## 2022-11-03 RX ORDER — ONDANSETRON 2 MG/ML
4 INJECTION INTRAMUSCULAR; INTRAVENOUS ONCE
Status: DISCONTINUED | OUTPATIENT
Start: 2022-11-03 | End: 2022-11-04 | Stop reason: HOSPADM

## 2022-11-03 RX ORDER — ESCITALOPRAM OXALATE 10 MG/1
10 TABLET ORAL DAILY
Status: DISCONTINUED | OUTPATIENT
Start: 2022-11-03 | End: 2022-11-04 | Stop reason: HOSPADM

## 2022-11-03 RX ORDER — HYDROMORPHONE HCL/PF 1 MG/ML
0.5 SYRINGE (ML) INJECTION EVERY 4 HOURS PRN
Status: DISCONTINUED | OUTPATIENT
Start: 2022-11-03 | End: 2022-11-04

## 2022-11-03 RX ORDER — LORAZEPAM 0.5 MG/1
0.5 TABLET ORAL 3 TIMES DAILY
Status: DISCONTINUED | OUTPATIENT
Start: 2022-11-03 | End: 2022-11-04 | Stop reason: HOSPADM

## 2022-11-03 RX ORDER — KETAMINE HYDROCHLORIDE 50 MG/ML
5 INJECTION, SOLUTION, CONCENTRATE INTRAMUSCULAR; INTRAVENOUS ONCE
Status: COMPLETED | OUTPATIENT
Start: 2022-11-03 | End: 2022-11-03

## 2022-11-03 RX ORDER — DONEPEZIL HYDROCHLORIDE 5 MG/1
5 TABLET, FILM COATED ORAL
Status: DISCONTINUED | OUTPATIENT
Start: 2022-11-03 | End: 2022-11-04 | Stop reason: HOSPADM

## 2022-11-03 RX ORDER — LORAZEPAM 2 MG/ML
1 INJECTION INTRAMUSCULAR ONCE
Status: COMPLETED | OUTPATIENT
Start: 2022-11-03 | End: 2022-11-03

## 2022-11-03 RX ORDER — WATER 1000 ML/1000ML
INJECTION, SOLUTION INTRAVENOUS
Status: COMPLETED
Start: 2022-11-03 | End: 2022-11-03

## 2022-11-03 RX ORDER — SODIUM CHLORIDE, SODIUM LACTATE, POTASSIUM CHLORIDE, CALCIUM CHLORIDE 600; 310; 30; 20 MG/100ML; MG/100ML; MG/100ML; MG/100ML
125 INJECTION, SOLUTION INTRAVENOUS CONTINUOUS
Status: DISCONTINUED | OUTPATIENT
Start: 2022-11-03 | End: 2022-11-04

## 2022-11-03 RX ADMIN — LORAZEPAM 1 MG: 2 INJECTION, SOLUTION INTRAMUSCULAR; INTRAVENOUS at 06:05

## 2022-11-03 RX ADMIN — LORAZEPAM 1 MG: 2 INJECTION INTRAMUSCULAR; INTRAVENOUS at 02:38

## 2022-11-03 RX ADMIN — ESCITALOPRAM OXALATE 10 MG: 10 TABLET ORAL at 15:51

## 2022-11-03 RX ADMIN — LORAZEPAM 0.5 MG: 0.5 TABLET ORAL at 15:51

## 2022-11-03 RX ADMIN — WATER: 1 INJECTION INTRAMUSCULAR; INTRAVENOUS; SUBCUTANEOUS at 01:28

## 2022-11-03 RX ADMIN — OLANZAPINE 10 MG: 10 INJECTION, POWDER, LYOPHILIZED, FOR SOLUTION INTRAMUSCULAR at 01:24

## 2022-11-03 RX ADMIN — IOHEXOL 80 ML: 350 INJECTION, SOLUTION INTRAVENOUS at 04:21

## 2022-11-03 RX ADMIN — OLANZAPINE 5 MG: 2.5 TABLET, FILM COATED ORAL at 15:51

## 2022-11-03 RX ADMIN — SODIUM CHLORIDE 1000 ML: 0.9 INJECTION, SOLUTION INTRAVENOUS at 06:04

## 2022-11-03 RX ADMIN — KETAMINE HYDROCHLORIDE 270 MG: 50 INJECTION INTRAMUSCULAR; INTRAVENOUS at 03:21

## 2022-11-03 RX ADMIN — SODIUM CHLORIDE, SODIUM LACTATE, POTASSIUM CHLORIDE, AND CALCIUM CHLORIDE 125 ML/HR: .6; .31; .03; .02 INJECTION, SOLUTION INTRAVENOUS at 15:07

## 2022-11-03 RX ADMIN — SODIUM CHLORIDE, SODIUM LACTATE, POTASSIUM CHLORIDE, AND CALCIUM CHLORIDE 125 ML/HR: .6; .31; .03; .02 INJECTION, SOLUTION INTRAVENOUS at 22:17

## 2022-11-03 NOTE — CONSULTS
Consultation - General Surgery   Jordan Lira 50 y o  female MRN: [id number]  Unit/Bed#: ED-09 Encounter: [id number]    Assessment/Plan     Assessment:  50yo non-verbal female with history of down's syndrome who presents with abdominal pain and cholelithiasis, questionable mild GB wall thickening  Plan:  - would recommend admission to medicine for further workup  - keep NPO, may have sips w meds  - IVF hydration  - trend LFT's  - hold off on abx given uncertainty of cholecystitis  - obtain HIDA scan to eval for cholecystitis  - analgesia PRN, avoid morphine  - obtain stool cultures given history of sick contacts      History of Present Illness     HPI:  Jordan Lira is a 50 y o  female, with down's syndrome who is nonverbal, who presents with abdominal pain  Reportedly patient began clutching at abdomen yesterday  Caregivers at bedside deny nausea or vomiting  They are unsure about diarrhea  They do note that patient has had some sick contacts possibly with C diff  She has no history of prior abdominal surgery  LFT's on initial evaluation were mildly elevated to  and  but normal total vilirubin of 0 86  WBC is 10  Consults    Review of Systems   Constitutional: Negative for chills and fever  HENT: Negative for ear pain and sore throat  Eyes: Negative for pain and visual disturbance  Respiratory: Negative for cough and shortness of breath  Cardiovascular: Negative for chest pain and palpitations  Gastrointestinal: Positive for abdominal pain  Negative for blood in stool, constipation, diarrhea, nausea and vomiting  Genitourinary: Negative for dysuria and hematuria  Musculoskeletal: Negative for arthralgias and back pain  Skin: Negative for color change and rash  Neurological: Negative for seizures and syncope  All other systems reviewed and are negative        Historical Information   Past Medical History:   Diagnosis Date   • Anxiety    • Dementia ([facility] [address] )    • Depression    • Down syndrome     non verbal   • Incontinence     bowel/ bladder   • Irritable bowel syndrome      Past Surgical History:   Procedure Laterality Date   • COLONOSCOPY     • SKIN LESION EXCISION Right 3/16/2022    Procedure: REMOVE CHEEK & LOWER EYELID  LESIONS;  Surgeon: Joan Kiser MD;  Location: [address] [place] [address] MAIN OR;  Service: Plastics     Social History   Social History     Substance and Sexual Activity   Alcohol Use Never     Social History     Substance and Sexual Activity   Drug Use Never     E-Cigarette/Vaping   • E-Cigarette Use Never User      E-Cigarette/Vaping Substances   • Nicotine No    • THC No    • CBD No    • Flavoring No    • Other No    • Unknown No      Social History     Tobacco Use   Smoking Status Never Smoker   Smokeless Tobacco Never Used     Family History: non-contributory    Meds/Allergies   all current active meds have been reviewed  Allergies   Allergen Reactions   • Tylenol [Acetaminophen] Other (See Comments)     Gets very excited       Objective   First Vitals:   Blood Pressure: 98/57 (11/03/22 0343)  Pulse: 85 (11/03/22 0343)  Temperature: 98 2 °F (36 8 °C) (11/03/22 0739)  Temp Source: Tympanic (11/03/22 0739)  Respirations: 18 (11/03/22 0343)  SpO2: 96 % (11/03/22 0343)    Current Vitals:   Blood Pressure: 92/52 (11/03/22 0800)  Pulse: 70 (11/03/22 0800)  Temperature: 98 2 °F (36 8 °C) (11/03/22 0739)  Temp Source: Tympanic (11/03/22 0739)  Respirations: 18 (11/03/22 0800)  SpO2: 96 % (11/03/22 0800)    No intake or output data in the 24 hours ending 11/03/22 0846    Invasive Devices  Report    Peripheral Intravenous Line  Duration           Peripheral IV 11/03/22 Right Antecubital <1 day                Physical Exam  Vitals and nursing note reviewed  Constitutional:       General: She is not in acute distress  Appearance: She is well-developed  HENT:      Head: Normocephalic and atraumatic        Mouth/Throat:      Mouth: Mucous membranes are moist    Eyes: Conjunctiva/sclera: Conjunctivae normal    Cardiovascular:      Rate and Rhythm: Normal rate and regular rhythm  Heart sounds: No murmur heard  Pulmonary:      Effort: Pulmonary effort is normal  No respiratory distress  Breath sounds: Normal breath sounds  Abdominal:      Palpations: Abdomen is soft  Comments: Abdominal exam difficult based on noncompliance with physical exam but abdomen is soft without significant rebound, non focally tender  Voluntary guarding is present   Musculoskeletal:         General: No swelling  Cervical back: Neck supple  Skin:     General: Skin is warm and dry  Capillary Refill: Capillary refill takes less than 2 seconds  Coloration: Skin is not jaundiced  Neurological:      Mental Status: She is alert  Mental status is at baseline  Lab Results:   CBC:   Lab Results   Component Value Date    WBC 10 53 (H) 11/03/2022    HGB 13 1 11/03/2022    HCT 40 5 11/03/2022     (H) 11/03/2022     11/03/2022    MCH 33 0 11/03/2022    MCHC 32 3 11/03/2022    RDW 13 5 11/03/2022    MPV 10 9 11/03/2022    NRBC 0 11/03/2022   , CMP:   Lab Results   Component Value Date    SODIUM 140 11/03/2022    K 3 9 11/03/2022     11/03/2022    CO2 27 11/03/2022    BUN 20 11/03/2022    CREATININE 1 17 11/03/2022    CALCIUM 8 0 (L) 11/03/2022     (H) 11/03/2022     (H) 11/03/2022    ALKPHOS 351 (H) 11/03/2022    EGFR 54 11/03/2022     Imaging: I have personally reviewed pertinent reports  US right upper quadrant  Narrative: RIGHT UPPER QUADRANT ULTRASOUND    INDICATION:     Intermittent abdominal pain, CT scan findings concerning for cholecystitis  COMPARISON:  CT abdomen and pelvis 11/3/2022    TECHNIQUE:   Real-time ultrasound of the right upper quadrant was performed with a curvilinear transducer with both volumetric sweeps and still imaging techniques      FINDINGS:    Sonographer reports a technically difficult study due to patient being unable to remain still and inability to temporary hold breath  PANCREAS:  Visualized portions of the pancreas are within normal limits  AORTA AND IVC:  Not visualized  LIVER:  Size:  Within normal range  The liver measures 12 7 cm in the midclavicular line  Contour:  Surface contour is smooth  Parenchyma:  Echogenicity and echotexture are within normal limits  No liver mass identified  Limited imaging of the main portal vein shows it to be patent and hepatopetal      BILIARY:  The gallbladder is normal in caliber  Mildly thickened gallbladder wall  No pericholecystic fluid  Wall echo shadow complex attributed to a gallbladder full of gallstones  Mild prominence of the central intrahepatic bile ducts  CBD measures 8 0 mm  No choledocholithiasis  KIDNEY:   Right kidney measures 8 9 x 3 2 x 3 9 cm  Volume 58 5 mL  Kidney within normal limits  ASCITES:   None  Impression: Cholelithiasis with minimal gallbladder wall thickening and mild biliary dilation  Sonographer reports a negative sonographic Campbell sign and technically difficult study  Cannot exclude acute cholecystitis  Correlate with clinical findings  Consider   follow-up with a HIDA scan if it would alter patient management  Workstation performed: [id number]  CT abdomen pelvis with contrast  Narrative: CT ABDOMEN AND PELVIS WITH IV CONTRAST    INDICATION:   Abdominal pain, acute, nonlocalized  Generalized abdominal pain, nonverbal at baseline  COMPARISON:  None  TECHNIQUE:  CT examination of the abdomen and pelvis was performed  Axial, sagittal, and coronal 2D reformatted images were created from the source data and submitted for interpretation  Radiation dose length product (DLP) for this visit:  269 mGy-cm     This examination, like all CT scans performed in the [facility], was performed utilizing techniques to minimize radiation dose exposure, including the use of iterative   reconstruction and automated exposure control  IV Contrast:  80 mL of iohexol (OMNIPAQUE)  Enteric Contrast:  Enteric contrast was not administered  FINDINGS:    ABDOMEN    LOWER CHEST:  Small hiatal hernia noted  No other clinically significant abnormality identified in the visualized lower chest     LIVER/BILIARY TREE:  Subcentimeter low-attenuation lesion near the fissure for the ligamentum teres, too small to characterize  GALLBLADDER:  Gallbladder appears somewhat contracted, however, there is wall thickening and gallstones with mild biliary ductal dilation  SPLEEN:  Unremarkable  PANCREAS:  Unremarkable  ADRENAL GLANDS:  Unremarkable  KIDNEYS/URETERS:  Unremarkable  No hydronephrosis  STOMACH AND BOWEL:  Unremarkable  APPENDIX:  No findings to suggest appendicitis  ABDOMINOPELVIC CAVITY:  No ascites  No pneumoperitoneum  No lymphadenopathy  VESSELS:  Unremarkable for patient's age  PELVIS    REPRODUCTIVE ORGANS:  Unremarkable for patient's age  URINARY BLADDER:  Unremarkable  ABDOMINAL WALL/INGUINAL REGIONS:  Subcutaneous edema and emphysema in the right lateral hip may be related to post injection changes  OSSEOUS STRUCTURES:  No acute fracture or destructive osseous lesion  Impression: Cholelithiasis with some contraction of the gallbladder and mild gallbladder wall thickening  Recommend right upper quadrant ultrasound to assess for acute cholecystitis  Subcentimeter low-attenuation lesion near the fissure for the ligamentum teres, too small to characterize  The study was marked in [facility] for immediate notification  Workstation performed: [id number]      EKG, Pathology, and Other Studies: I have personally reviewed pertinent reports     and I have personally reviewed pertinent films in PACS    Kristin Conley MD  [id number] [facility] Chief Resident PGY6

## 2022-11-03 NOTE — ASSESSMENT & PLAN NOTE
· Non verbal at baseline; has family and caregivers at bedside to help when pt is agitated  · Restraints as needed

## 2022-11-03 NOTE — QUICK NOTE
Quick Note - General Surgery   Kristopher Chacon 50 y o  female MRN: [id number]  Unit/Bed#: ED-09 Encounter: [id number]    Patient seen and examined by overnight surgery resident  CT A/P and RUQ U/S did not demonstrate overt signs of cholecystitis; however, could not be ruled out  She is noted to have mild transaminitis with normal bilirubin and mild biliary ductal dilation (CBD 8 mm) on her CT images as well as ultrasound  Given that it is difficult to obtain an accurate HPI or physical exam given the patient is nonverbal at baseline and is currently agitated, would recommend medicine admission for observation with plan for HIDA scan to definitively rule out cholecystitis  Full consult note to follow        Chadd Yee, General Surgery

## 2022-11-03 NOTE — ED PROVIDER NOTES
History  Chief Complaint   Patient presents with   • Abdominal Pain       Complaints of abdominal pain that started tonight  Hx of down syndrome and dementia  Uncooperative at this time  Emerson Henley is a 50year old female with Down syndrome, nonverbal at baseline, presenting with her caregivers for evaluation of 2 days of generalized abdominal pain  They describe with a significant episode last evening with the patient appeared to be clutching at her belly and having a lot of pain in the area, they attempted to palpate the region but the patient would not let them  She appeared to be gagging, but did not actively vomit  She has been having upwards of 3 bowel movements daily, they note recent potential contact with someone who contracted C diff  No fevers at home  Unsure of any history of UTIs  No significant abdominal surgical history  History provided by:  Caregiver   used: No    Abdominal Pain      Prior to Admission Medications   Prescriptions Last Dose Informant Patient Reported? Taking?    LORazepam (ATIVAN) 1 mg tablet  Family Member Yes No   Sig: Take 0 5 mg by mouth 3 (three) times a day     OLANZapine (ZyPREXA) 5 mg tablet   No No   Sig: Take 1 tablet (5 mg total) by mouth 2 (two) times a day   Simethicone (GAS-X PO)   Yes No   Sig: Take by mouth if needed     donepezil (ARICEPT) 5 mg tablet  Family Member Yes No   Sig: Take 5 mg by mouth daily at bedtime   escitalopram (LEXAPRO) 10 mg tablet  Family Member Yes No   Sig: Take 10 mg by mouth daily      Facility-Administered Medications: None       Past Medical History:   Diagnosis Date   • Anxiety    • Dementia ([facility] [address] )    • Depression    • Down syndrome     non verbal   • Incontinence     bowel/ bladder   • Irritable bowel syndrome        Past Surgical History:   Procedure Laterality Date   • COLONOSCOPY     • SKIN LESION EXCISION Right 3/16/2022    Procedure: REMOVE CHEEK & LOWER EYELID  LESIONS;  Surgeon: Keely Chin, MD;  Location: [facility] MAIN OR;  Service: Plastics       History reviewed  No pertinent family history  I have reviewed and agree with the history as documented  E-Cigarette/Vaping   • E-Cigarette Use Never User      E-Cigarette/Vaping Substances   • Nicotine No    • THC No    • CBD No    • Flavoring No    • Other No    • Unknown No      Social History     Tobacco Use   • Smoking status: Never Smoker   • Smokeless tobacco: Never Used   Vaping Use   • Vaping Use: Never used   Substance Use Topics   • Alcohol use: Never   • Drug use: Never        Review of Systems   Unable to perform ROS: Patient nonverbal   Gastrointestinal: Positive for abdominal pain  Physical Exam  ED Triage Vitals   Temperature Pulse Respirations Blood Pressure SpO2   11/03/22 0739 11/03/22 0343 11/03/22 0343 11/03/22 0343 11/03/22 0343   98 2 °F (36 8 °C) 85 18 98/57 96 %      Temp Source Heart Rate Source Patient Position - Orthostatic VS BP Location FiO2 (%)   11/03/22 0739 11/03/22 0343 11/03/22 0343 11/03/22 0343 --   Tympanic Monitor Lying Left arm       Pain Score       --                    Orthostatic Vital Signs  Vitals:    11/03/22 0343 11/03/22 0600   BP: 98/57 102/55   Pulse: 85 78   Patient Position - Orthostatic VS: Lying Lying       Physical Exam  Vitals and nursing note reviewed  Constitutional:       General: She is not in acute distress  Appearance: Normal appearance  HENT:      Head: Normocephalic and atraumatic  Mouth/Throat:      Mouth: Mucous membranes are moist       Pharynx: Oropharynx is clear  Eyes:      General: No scleral icterus  Conjunctiva/sclera: Conjunctivae normal    Cardiovascular:      Rate and Rhythm: Normal rate and regular rhythm  Heart sounds: Normal heart sounds  Pulmonary:      Effort: Pulmonary effort is normal  No respiratory distress  Breath sounds: Normal breath sounds  Abdominal:      General: Abdomen is flat  There is no distension        Palpations: Abdomen is soft       Tenderness: There is no abdominal tenderness  Comments: Patient in on peer to have significant tenderness when I palpate her abdomen, though she was not very cooperative during examination   Musculoskeletal:         General: No tenderness or signs of injury  Cervical back: Neck supple  No rigidity  Skin:     General: Skin is warm  Coloration: Skin is not jaundiced  Findings: No erythema or rash  Neurological:      General: No focal deficit present  Mental Status: She is alert  Mental status is at baseline     Psychiatric:         Mood and Affect: Mood normal          Behavior: Behavior normal          ED Medications  Medications   OLANZapine (ZyPREXA) IM injection 10 mg (10 mg Intramuscular Given 11/3/22 0124)   sterile water injection **ADS Override Pull** (  Given 11/3/22 0128)   LORazepam (ATIVAN) injection 1 mg (1 mg Intramuscular Given 11/3/22 0238)   ketamine (KETALAR) 270 mg (270 mg Intramuscular Given by Other 11/3/22 0321)   iohexol (OMNIPAQUE) 350 MG/ML injection (SINGLE-DOSE) 80 mL (80 mL Intravenous Given 11/3/22 0421)   sodium chloride 0 9 % bolus 1,000 mL (1,000 mL Intravenous New Bag 11/3/22 0604)   LORazepam (ATIVAN) injection 1 mg (1 mg Intravenous Given 11/3/22 0605)       Diagnostic Studies  Results Reviewed     Procedure Component Value Units Date/Time    CBC and differential [[id number]]  (Abnormal) Collected: 11/03/22 0424    Lab Status: Final result Specimen: Blood from Arm, Right Updated: 11/03/22 0537     WBC 10 53 Thousand/uL      RBC 3 97 Million/uL      Hemoglobin 13 1 g/dL      Hematocrit 40 5 %       fL      MCH 33 0 pg      MCHC 32 3 g/dL      RDW 13 5 %      MPV 10 9 fL      Platelets 742 Thousands/uL      nRBC 0 /100 WBCs      Neutrophils Relative 90 %      Immat GRANS % 0 %      Lymphocytes Relative 3 %      Monocytes Relative 5 %      Eosinophils Relative 1 %      Basophils Relative 1 %      Neutrophils Absolute 9 46 Thousands/µL Immature Grans Absolute 0 04 Thousand/uL      Lymphocytes Absolute 0 35 Thousands/µL      Monocytes Absolute 0 57 Thousand/µL      Eosinophils Absolute 0 05 Thousand/µL      Basophils Absolute 0 06 Thousands/µL     Narrative: This is an appended report  These results have been appended to a previously verified report      Comprehensive metabolic panel [[id number]]  (Abnormal) Collected: 11/03/22 0424    Lab Status: Final result Specimen: Blood from Arm, Right Updated: 11/03/22 0500     Sodium 140 mmol/L      Potassium 3 9 mmol/L      Chloride 106 mmol/L      CO2 27 mmol/L      ANION GAP 7 mmol/L      BUN 20 mg/dL      Creatinine 1 17 mg/dL      Glucose 109 mg/dL      Calcium 8 0 mg/dL      Corrected Calcium 8 6 mg/dL       U/L       U/L      Alkaline Phosphatase 351 U/L      Total Protein 6 7 g/dL      Albumin 3 3 g/dL      Total Bilirubin 0 86 mg/dL      eGFR 54 ml/min/1 73sq m     Narrative:      [place] guidelines for Chronic Kidney Disease (CKD):   •  Stage 1 with normal or high GFR (GFR > 90 mL/min/1 73 square meters)  •  Stage 2 Mild CKD (GFR = 60-89 mL/min/1 73 square meters)  •  Stage 3A Moderate CKD (GFR = 45-59 mL/min/1 73 square meters)  •  Stage 3B Moderate CKD (GFR = 30-44 mL/min/1 73 square meters)  •  Stage 4 Severe CKD (GFR = 15-29 mL/min/1 73 square meters)  •  Stage 5 End Stage CKD (GFR <15 mL/min/1 73 square meters)  Note: GFR calculation is accurate only with a steady state creatinine    Lipase [[id number]]  (Abnormal) Collected: 11/03/22 0424    Lab Status: Final result Specimen: Blood from Arm, Right Updated: 11/03/22 0500     Lipase 205 u/L     Urine Microscopic [[id number]]  (Normal) Collected: 11/03/22 0429    Lab Status: Final result Specimen: Urine Updated: 11/03/22 0448     RBC, UA None Seen /hpf      WBC, UA None Seen /hpf      Epithelial Cells Occasional /hpf      Bacteria, UA None Seen /hpf     UA w Reflex to Microscopic w Reflex to Culture [[id number]]  (Abnormal) Collected: 11/03/22 0429    Lab Status: Final result Specimen: Urine Updated: 11/03/22 0447     Color, UA Light Yellow     Clarity, UA Clear     Specific Gravity, UA 1 013     pH, UA 5 0     Leukocytes, UA Negative     Nitrite, UA Negative     Protein, UA Negative mg/dl      Glucose, UA Negative mg/dl      Ketones, UA Negative mg/dl      Urobilinogen, UA <2 0 mg/dl      Bilirubin, UA Negative     Occult Blood, UA Trace    Clostridium difficile toxin by PCR with EIA [[id number]]     Lab Status: No result Specimen: Stool     Stool Enteric Bacterial Panel by PCR [[id number]]     Lab Status: No result Specimen: Stool                  US right upper quadrant   Final Result by Vicky Silvestre MD (11/03 0805)      Cholelithiasis with minimal gallbladder wall thickening and mild biliary dilation  Sonographer reports a negative sonographic Campbell sign and technically difficult study  Cannot exclude acute cholecystitis  Correlate with clinical findings  Consider    follow-up with a HIDA scan if it would alter patient management  Workstation performed: [id number]         CT abdomen pelvis with contrast   Final Result by Hardik Napier DO (11/03 0440)      Cholelithiasis with some contraction of the gallbladder and mild gallbladder wall thickening  Recommend right upper quadrant ultrasound to assess for acute cholecystitis  Subcentimeter low-attenuation lesion near the fissure for the ligamentum teres, too small to characterize  The study was marked in [facility] for immediate notification  Workstation performed: [id number]               Procedures  Procedures      ED Course  ED Course as of 11/03/22 0828   Thu Nov 03, 2022   8368 Despite doses of IM Ativan and Zyprexa, unable to obtain peripheral IV due to agitation  Patient given IM ketamine at this time, will attempt to place IV, get straight cath, and take to CT scanner     8684 CT findings concerning for possible cholecystitis, will need follow-up with ultrasound and discuss with surgery team                             SBIRT 20yo+    Flowsheet Row Most Recent Value   SBIRT (25 yo +)    In order to provide better care to our patients, we are screening all of our patients for alcohol and drug use  Would it be okay to ask you these screening questions? Yes Filed at: 11/03/2022 0600   Initial Alcohol Screen: US AUDIT-C     1  How often do you have a drink containing alcohol? 0 Filed at: 11/03/2022 0600   2  How many drinks containing alcohol do you have on a typical day you are drinking? 0 Filed at: 11/03/2022 0600   3a  Male UNDER 65: How often do you have five or more drinks on one occasion? 0 Filed at: 11/03/2022 0600   3b  FEMALE Any Age, or MALE 65+: How often do you have 4 or more drinks on one occassion? 0 Filed at: 11/03/2022 0600   Audit-C Score 0 Filed at: 11/03/2022 0600   MIRIAM: How many times in the past year have you    Used an illegal drug or used a prescription medication for non-medical reasons? Never Filed at: 11/03/2022 0600                MDM  Number of Diagnoses or Management Options  Abnormal CT scan, gallbladder: new and requires workup  Elevated LFTs: new and requires workup  Risk of Complications, Morbidity, and/or Mortality  General comments: Hesham Trujillo is a 50year old female with Down syndrome, nonverbal at baseline, presenting with her caregivers for evaluation of 2 days of generalized abdominal pain  Patient's vital signs were unremarkable, patient not appear to be in any significant pain on examination, patient did not appear to have tenderness when I palpated her abdomen though she was not very cooperative throughout examination, difficult to assess given that she is nonverbal   Did have concern for acute intra-abdominal pathology given the family story of her pain      Had difficulty obtaining IV, patient required a ketamine before we were able to finally placed 1 and taken to CT scan     CT of the abdomen did show findings concerning for possible cholecystitis  Patient did have elevated LFTs, elevated alk phos, elevated lipase further concerning for acute biliary pathology  Follow-up ultrasound of the right upper quadrant showed cholelithiasis with minimal gallbladder wall thickening and mild biliary dilation though study was limited due to patient's lack of cooperation  The case was discussed with the general surgery team who evaluated the patient described that they had low suspicion  For coli cystitis but the findings were concerning enough that she should have follow-up with HIDA scan  Patient to be admitted to the medicine team for further evaluation  Patient Progress  Patient progress: stable      Disposition  Final diagnoses:   Abnormal CT scan, gallbladder   Elevated LFTs     Time reflects when diagnosis was documented in both MDM as applicable and the Disposition within this note     Time User Action Codes Description Comment    11/3/2022  8:16 AM Bart Kellogg Add [R93 2] Abnormal CT scan, gallbladder     11/3/2022  8:16 AM Bart Kellogg Add [R79 89] Elevated LFTs       ED Disposition     ED Disposition   Admit    Condition   Stable    Date/Time   u Nov 3, 2022  8:27 AM    Comment   Case was discussed with Dr Kiarra Cortes and the patient's admission status was agreed to be Admission Status: observation status to the service of Dr Meeta Mckeon  Follow-up Information    None         Patient's Medications   Discharge Prescriptions    No medications on file     No discharge procedures on file  PDMP Review       Value Time User    PDMP Reviewed  Yes 11/3/2022 12:53 AM Jannie Ruiz MD           ED Provider  Attending physically available and evaluated Em Fernandez I managed the patient along with the ED Attending      Electronically Signed by         Obey Alvarez DO  11/03/22 2321

## 2022-11-03 NOTE — ASSESSMENT & PLAN NOTE
· POA with 2 days of suspected abd pain (pt nonverbal at baseline), but seen clutching her abd  · Labs notable for elevated AST/ALT/ALP; normal bilirubin;  Elevated lipase but not 3x ULN    · CT abd/pelvis showed Cholelithiasis with some contraction of the gallbladder and mild gallbladder wall thickening     · RUQ US with cholelithiasis with minimal GB thickening  · Gen surgery c/s: low suspicion for cholecystitis  · Check HIDA for definitive r/o  · Keep NPO   · Pain control; avoid morphine   · Anti-emetics

## 2022-11-03 NOTE — H&P
[address] 1975, 50 y o  female MRN: [id number]  Unit/Bed#: ED-09 Encounter: [id number]  Primary Care Provider: No primary care provider on file  Date and time admitted to hospital: 11/2/2022 10:47 PM    * Abdominal pain  Assessment & Plan  · POA with 2 days of suspected abd pain (pt nonverbal at baseline), but seen clutching her abd  · Labs notable for elevated AST/ALT/ALP; normal bilirubin;  Elevated lipase but not 3x ULN    · CT abd/pelvis showed Cholelithiasis with some contraction of the gallbladder and mild gallbladder wall thickening  · RUQ US with cholelithiasis with minimal GB thickening  · Gen surgery c/s: low suspicion for cholecystitis  · Check HIDA for definitive r/o  · Keep NPO   · Pain control; avoid morphine   · Anti-emetics      Down syndrome  Assessment & Plan  · Non verbal at baseline       VTE Pharmacologic Prophylaxis:   Low Risk (Score 0-2) - Encourage Ambulation  Code Status: FULL   Discussion with family: Updated  (caregiver ) at bedside  Anticipated Length of Stay: Patient will be admitted on an observation basis with an anticipated length of stay of less than 2 midnights secondary to abd pain   Total Time for Visit, including Counseling / Coordination of Care: 60 minutes Greater than 50% of this total time spent on direct patient counseling and coordination of care  Chief Complaint:  abd pain     History of Present Illness:  Murray Nicole is a 50 y o  female with down syndrome who presents with abdominal pain  Patient is nonverbal at baseline so unable to provide additional details  Review of at bedside notes that over the last 2 days she has appeared uncomfortable clutching her abdomen at times  Does not appear to be associated with food  Patient has had regular bowel movements  No fevers or chills observed    No vomiting    Review of Systems:  Review of Systems   Unable to perform ROS: Patient nonverbal       Past Medical and Surgical History:   Past Medical History:   Diagnosis Date   • Anxiety    • Dementia ([address] )    • Depression    • Down syndrome     non verbal   • Incontinence     bowel/ bladder   • Irritable bowel syndrome        Past Surgical History:   Procedure Laterality Date   • COLONOSCOPY     • SKIN LESION EXCISION Right 3/16/2022    Procedure: REMOVE CHEEK & LOWER EYELID  LESIONS;  Surgeon: Amalia Dunham MD;  Location: [address] [place] [address];  Service: Plastics       Meds/Allergies:  Prior to Admission medications    Medication Sig Start Date End Date Taking? Authorizing Provider   donepezil (ARICEPT) 5 mg tablet Take 5 mg by mouth daily at bedtime    Historical Provider, MD   escitalopram (LEXAPRO) 10 mg tablet Take 10 mg by mouth daily    Historical Provider, MD   LORazepam (ATIVAN) 1 mg tablet Take 0 5 mg by mouth 3 (three) times a day      Historical Provider, MD   OLANZapine (ZyPREXA) 5 mg tablet Take 1 tablet (5 mg total) by mouth 2 (two) times a day 8/24/22   Jesse Srivastava MD   Simethicone (GAS-X PO) Take by mouth if needed      Historical Provider, MD     I have reviewed home medications using recent Epic encounter  Allergies: Allergies   Allergen Reactions   • Tylenol [Acetaminophen] Other (See Comments)     Gets very excited       Social History:  Marital Status: Single   Occupation:   Patient Pre-hospital Living Situation: Home  Patient Pre-hospital Level of Mobility: walks  Patient Pre-hospital Diet Restrictions:   Substance Use History:   Social History     Substance and Sexual Activity   Alcohol Use Never     Social History     Tobacco Use   Smoking Status Never Smoker   Smokeless Tobacco Never Used     Social History     Substance and Sexual Activity   Drug Use Never       Family History:  History reviewed  No pertinent family history      Physical Exam:     Vitals:   Blood Pressure: 114/56 (11/03/22 1018)  Pulse: 60 (11/03/22 1018)  Temperature: 98 2 °F (36 8 °C) (11/03/22 9526)  Temp Source: Tympanic (11/03/22 0739)  Respirations: 18 (11/03/22 0800)  SpO2: 92 % (11/03/22 1018)    Physical Exam  Vitals and nursing note reviewed  Constitutional:       General: She is not in acute distress  Appearance: Normal appearance  She is not ill-appearing or toxic-appearing  HENT:      Head: Normocephalic and atraumatic  Cardiovascular:      Rate and Rhythm: Normal rate and regular rhythm  Pulmonary:      Effort: No respiratory distress  Breath sounds: Normal breath sounds  No wheezing or rhonchi  Abdominal:      General: Abdomen is flat  Palpations: Abdomen is soft  Tenderness: There is abdominal tenderness (epigastric )  Comments: (-) Adrian's   Neurological:      Mental Status: She is alert  Mental status is at baseline  Additional Data:     Lab Results:  Results from last 7 days   Lab Units 11/03/22  0424   WBC Thousand/uL 10 53*   HEMOGLOBIN g/dL 13 1   HEMATOCRIT % 40 5   PLATELETS Thousands/uL 189   NEUTROS PCT % 90*   LYMPHS PCT % 3*   MONOS PCT % 5   EOS PCT % 1     Results from last 7 days   Lab Units 11/03/22  0424   SODIUM mmol/L 140   POTASSIUM mmol/L 3 9   CHLORIDE mmol/L 106   CO2 mmol/L 27   BUN mg/dL 20   CREATININE mg/dL 1 17   ANION GAP mmol/L 7   CALCIUM mg/dL 8 0*   ALBUMIN g/dL 3 3*   TOTAL BILIRUBIN mg/dL 0 86   ALK PHOS U/L 351*   ALT U/L 153*   AST U/L 194*   GLUCOSE RANDOM mg/dL 109                       Imaging: Reviewed radiology reports from this admission including: abdominal/pelvic CT and ultrasound(s)  US right upper quadrant   Final Result by Radha Springer MD (11/03 0805)      Cholelithiasis with minimal gallbladder wall thickening and mild biliary dilation  Sonographer reports a negative sonographic Campbell sign and technically difficult study  Cannot exclude acute cholecystitis  Correlate with clinical findings  Consider    follow-up with a HIDA scan if it would alter patient management  Workstation performed: [id number]         CT abdomen pelvis with contrast   Final Result by Ashley Quinteros DO (11/03 0440)      Cholelithiasis with some contraction of the gallbladder and mild gallbladder wall thickening  Recommend right upper quadrant ultrasound to assess for acute cholecystitis  Subcentimeter low-attenuation lesion near the fissure for the ligamentum teres, too small to characterize  The study was marked in [facility] for immediate notification  Workstation performed: [id number]         NM hepatobiliary    (Results Pending)       EKG and Other Studies Reviewed on Admission:   · EKG: No EKG obtained  ** Please Note: This note has been constructed using a voice recognition system   **

## 2022-11-03 NOTE — ED NOTES
Family at bedside     Minesh Swenson [facility]  11/03/22 4908

## 2022-11-03 NOTE — TELEPHONE ENCOUNTER
Attempted to pre-treat patient with CCK for HIDA scan today  Patient uncooperative and would not allow access to her IV site  Patient's care givers had to use wrist restraints to allow me access to IV  Unable to flush IV, nurse made aware  Patient's condition will not allow a HIDA scan to be performed, unable to restrain patient during imaging  Imaging for HIDA scan is 1-2 hours long  Ordering provider Em Gaines made aware via [email or web address]

## 2022-11-03 NOTE — ED NOTES
Pt is now 94% - 95% humidified NC  Due to redness to pts nose        Kassandra Mood  11/03/22 Cindy  11/03/22 1039

## 2022-11-03 NOTE — ED NOTES
Nuclear Medicine in the room, giving pt medication for her test at 13:00  Pt will be txp to room once the Nuclear tech is finished administering Medications to the patient  Charge MILY Gamino was notified       Milo Hightower  11/03/22 1128

## 2022-11-03 NOTE — ED NOTES
Nasal Canula humidified place on pt due to her spo2 was 92% with a good phleth when she was sleeping/awake       Leslie Lopez  11/03/22 1026

## 2022-11-03 NOTE — ED NOTES
Patient still refusing vitals, IV  Held down with 5 individuals for 2 IM shots, waiting to see if Ativan effective in calming patient        Malini Wilcox RN  11/03/22 3119

## 2022-11-03 NOTE — ED ATTENDING ATTESTATION
11/2/2022  IRaquel MD, saw and evaluated the patient  I have discussed the patient with the resident/non-physician practitioner and agree with the resident's/non-physician practitioner's findings, Plan of Care, and MDM as documented in the resident's/non-physician practitioner's note, except where noted  All available labs and Radiology studies were reviewed  I was present for key portions of any procedure(s) performed by the resident/non-physician practitioner and I was immediately available to provide assistance  At this point I agree with the current assessment done in the Emergency Department  I have conducted an independent evaluation of this patient a history and physical is as follows: Patient is a 50year old female with down's syndrome and dementia who has had intermittent apparent abdominal pain for past 2 nights  No fever  No vomiting  No diarrhea but has had pasty foul smelling stool  No foul smelling urine  No abdominal surgery  Was last seen in this ED on 4/11/21 for acute respiratory failure with hypoxia  [facility] website checked on this patient and last Rx filled was on 10/13/22 for clonazepam and ativan for 30 day supplies each  Patient unable to provide hx  Mucous membranes somewhat moist  Lungs clear  Heart regular without murmur  ?diffuse abdominal tenderness  Good bowel sounds  (+) very agitated and aggressive with examination  DDx including but not limited to: appendicitis, gastroenteritis, gastritis, PUD, GERD, gastroparesis, hepatitis, pancreatitis, colitis, enteritis, food poisoning, mesenteric adenitis, epiploic appendagitis, IBD, IBS, ileus, bowel obstruction, volvulus, cholecystitis, biliary colic, choledocholithiasis, perforated viscus, tumor, splenic etiology, diverticulitis, internal hernia, constipation, pelvic pathology, renal colic, pyelonephritis, UTI  Will need to give sedation for agitation and workup  Will need labs and CT       ED Course         Critical Care Time  Procedures

## 2022-11-03 NOTE — CASE MANAGEMENT
Case Management Progress Note    Patient name Shilpi Sanders  Location ED-09/ED-09 MRN [id number]  : 1975 Date 11/3/2022       LOS (days): 0  Geometric Mean LOS (GMLOS) (days):   Days to GMLOS:        OBJECTIVE:        Current admission status: Observation  Preferred Pharmacy:   [facility], [address]  [address] [address]  Phone: [phone] Fax: [phone]    Primary Care Provider: No primary care provider on file  Primary Insurance: MEDICARE  Secondary Insurance: AETNA    PROGRESS NOTE:    Cm attempted to complete assessment with patient's family  Patient is noted to have down syndrome and non verbal upon chart review  Cm attempted to contact patient's listed emergency contact to complete assessment  Her first emergency contact, Mark Gutiérrez did not answer so CM contacted 2nd emergency contact Shantal Form, a close family friend  Per Shantal Form, patient's brother is Mark Gutiérrez, but that he and mother are currently at a  for patient's father  Cm informed by Shantal Form, that patient's father and mother have cared for her since birth  Patient might be sensing that patient's father is not around and can get very agitated  Cm informed that patient's immediate family will be at mass and the  of patient's father all day today  Cm then informed by Shantal Form, that patient has a caregiver and that person will help patient with dressing, feeding and all ADLS since patient is dependent for that level of care  Shantal Form did report that patient was ambulatory in the past  Patient is also incontinent of bowels and bladder  Patient's family may not be available until later today

## 2022-11-04 VITALS
SYSTOLIC BLOOD PRESSURE: 91 MMHG | DIASTOLIC BLOOD PRESSURE: 57 MMHG | HEART RATE: 72 BPM | OXYGEN SATURATION: 91 % | RESPIRATION RATE: 16 BRPM | TEMPERATURE: 99.5 F

## 2022-11-04 PROBLEM — R79.89 ELEVATED LFTS: Status: ACTIVE | Noted: 2022-11-04

## 2022-11-04 LAB
ALBUMIN SERPL BCP-MCNC: 2.6 G/DL (ref 3.5–5)
ALP SERPL-CCNC: 284 U/L (ref 34–104)
ALT SERPL W P-5'-P-CCNC: 113 U/L (ref 7–52)
ANION GAP SERPL CALCULATED.3IONS-SCNC: 6 MMOL/L (ref 4–13)
AST SERPL W P-5'-P-CCNC: 89 U/L (ref 13–39)
BILIRUB SERPL-MCNC: 0.91 MG/DL (ref 0.2–1)
BUN SERPL-MCNC: 10 MG/DL (ref 5–25)
CALCIUM ALBUM COR SERPL-MCNC: 9.1 MG/DL (ref 8.3–10.1)
CALCIUM SERPL-MCNC: 8 MG/DL (ref 8.4–10.2)
CHLORIDE SERPL-SCNC: 110 MMOL/L (ref 96–108)
CHOLEST SERPL-MCNC: 134 MG/DL
CO2 SERPL-SCNC: 24 MMOL/L (ref 21–32)
CREAT SERPL-MCNC: 0.86 MG/DL (ref 0.6–1.3)
FOLATE SERPL-MCNC: 5.5 NG/ML (ref 3.1–17.5)
GFR SERPL CREATININE-BSD FRML MDRD: 79 ML/MIN/1.73SQ M
GLUCOSE P FAST SERPL-MCNC: 72 MG/DL (ref 65–99)
GLUCOSE SERPL-MCNC: 72 MG/DL (ref 65–140)
HDLC SERPL-MCNC: 34 MG/DL
LDLC SERPL CALC-MCNC: 78 MG/DL (ref 0–100)
LIPASE SERPL-CCNC: 12 U/L (ref 11–82)
NONHDLC SERPL-MCNC: 100 MG/DL
POTASSIUM SERPL-SCNC: 4.3 MMOL/L (ref 3.5–5.3)
PROT SERPL-MCNC: 5.7 G/DL (ref 6.4–8.4)
SODIUM SERPL-SCNC: 140 MMOL/L (ref 135–147)
TRIGL SERPL-MCNC: 108 MG/DL
VIT B12 SERPL-MCNC: 366 PG/ML (ref 100–900)

## 2022-11-04 RX ORDER — LIDOCAINE 40 MG/G
CREAM TOPICAL ONCE
Status: DISCONTINUED | OUTPATIENT
Start: 2022-11-04 | End: 2022-11-04 | Stop reason: HOSPADM

## 2022-11-04 RX ORDER — DIVALPROEX SODIUM 125 MG/1
125 TABLET, DELAYED RELEASE ORAL EVERY 12 HOURS SCHEDULED
Qty: 60 TABLET | Refills: 0 | Status: SHIPPED | OUTPATIENT
Start: 2022-11-05

## 2022-11-04 RX ORDER — CYANOCOBALAMIN (VITAMIN B-12) 2000 MCG
2000 TABLET ORAL DAILY
Qty: 30 TABLET | Refills: 0 | Status: SHIPPED | OUTPATIENT
Start: 2022-11-04

## 2022-11-04 RX ORDER — FOLIC ACID 1 MG/1
1 TABLET ORAL DAILY
Qty: 30 TABLET | Refills: 0 | Status: SHIPPED | OUTPATIENT
Start: 2022-11-04

## 2022-11-04 RX ADMIN — ESCITALOPRAM OXALATE 10 MG: 10 TABLET ORAL at 09:50

## 2022-11-04 RX ADMIN — OLANZAPINE 5 MG: 2.5 TABLET, FILM COATED ORAL at 09:50

## 2022-11-04 RX ADMIN — LORAZEPAM 0.5 MG: 0.5 TABLET ORAL at 09:50

## 2022-11-04 NOTE — ASSESSMENT & PLAN NOTE
· Unclear etiology but notable improvement overnight    Per my discussion with patient's PCP, recommend repeat LFTs be obtained as an outpatient

## 2022-11-04 NOTE — DISCHARGE SUMMARY
[facility]  Discharge- Meera Crain 1975, 50 y o  female MRN: [id number]  Unit/Bed#: W -01 Encounter: [id number]  Primary Care Provider: No primary care provider on file  Date and time admitted to hospital: 11/2/2022 10:47 PM    * Abdominal pain  Assessment & Plan  · POA with 2 days of suspected abd pain (pt nonverbal at baseline), but seen clutching her abd  · Labs notable for elevated AST/ALT/ALP; normal bilirubin;  Elevated lipase but not 3x ULN  · CT abd/pelvis showed Cholelithiasis with some contraction of the gallbladder and mild gallbladder wall thickening  · RUQ US with cholelithiasis with minimal GB thickening  · Gen surgery c/s: low suspicion for cholecystitis  · Initially planned for HIDA but patient was unable to tolerate it  · Tolerated clear liquids this morning, advanced diet--recommend following a low-fat diet      Elevated LFTs  Assessment & Plan  · Unclear etiology but notable improvement overnight  Per my discussion with patient's PCP, recommend repeat LFTs be obtained as an outpatient    Down syndrome  Assessment & Plan  · Non verbal at baseline; has family and caregivers at bedside to help when pt is agitated      Dementia ([facility] [address] )  Assessment & Plan  · Continue Aricept, Lexapro, Ativan, Zyprexa  · Consult to Geriatrics to assist family with support and suggestions regarding behavioral issues  Low-dose Depakote was recommended  Will suggest initiation after follow-up LFTs were obtained to ensure improvement 1st    Elevated MCV  Assessment & Plan  · Reviewed B12 and folate levels, will recommend supplementation      Medical Problems             Resolved Problems  Date Reviewed: 11/4/2022   None               Discharging Physician / Practitioner: Delfino Carrillo  PCP: No primary care provider on file    Admission Date:   Admission Orders (From admission, onward)     Ordered        11/03/22 0828  Place in Observation  Once                      Discharge Date: 11/04/22    Consultations During Hospital Stay:  · General surgery  · Geriatric    Procedures Performed:   · Abdominal ultrasound  · CT abdomen pelvis    Significant Findings / Test Results:   · As above    Incidental Findings:   · None     Test Results Pending at Discharge (will require follow up): · Vitamin-D level     Outpatient Tests Requested:  · Repeat LFTs    Complications:  None    Reason for Admission:  Suspected abdominal pain    Hospital Course:   Murray Nicole is a 50 y o  female patient with history of Down syndrome who is nonverbal who originally presented to the hospital on 11/2/2022 due to elevated LFTs  There was also suspicion that patient was experiencing abdominal pain based on body language observed by the family  In conjunction with the elevated LFTs, patient underwent CT scan and abdominal ultrasound which did show cholelithiasis without compelling evidence of cholecystitis  Plan was for a HIDA scan but patient could not cooperate with the scan  Fortunately after receiving IV fluids and being restricted to clear liquid diet, it appeared she did improve overnight  She was not exhibiting any other signs of abdominal pain and her LFTs improved  After discussion with surgery in the family we agreed to try advancing her diet and observing  She did well and was stable for discharge home  She could certainly follow-up with General surgery as an outpatient to weigh the risks and benefits of elective cholecystectomy  In addition while hospitalized I consult Geriatrics at the family's request to assist in behavior management at home and initiation of Depakote was recommended    Please see above list of diagnoses and related plan for additional information  Condition at Discharge: stable    Discharge Day Visit / Exam:   Subjective:  Upon my evaluation in the morning patient's family felt that she was looking better and not exhibiting any signs of pain    Vitals: Blood Pressure: 91/57 (11/04/22 0750)  Pulse: 72 (11/04/22 0750)  Temperature: 99 5 °F (37 5 °C) (11/03/22 2133)  Temp Source: Oral (11/03/22 1647)  Respirations: 16 (11/04/22 0750)  SpO2: 91 % (11/04/22 0750)  Exam:   Physical Exam  Vitals reviewed  Constitutional:       General: She is not in acute distress  Eyes:      Conjunctiva/sclera: Conjunctivae normal    Cardiovascular:      Rate and Rhythm: Normal rate and regular rhythm  Pulmonary:      Effort: No respiratory distress  Breath sounds: No wheezing  Abdominal:      General: There is no distension  Palpations: Abdomen is soft  Tenderness: There is no abdominal tenderness  There is no guarding  Musculoskeletal:      Right lower leg: No edema  Left lower leg: No edema  Skin:     General: Skin is warm and dry  Coloration: Skin is not jaundiced or pale  Findings: No bruising, erythema, lesion or rash  Psychiatric:      Comments: calm cooperative, noted to smile when her mother entered the room          Discussion with Family: spoke with mom, aunt and 2 other family members at bedside  Discharge instructions/Information to patient and family:   See after visit summary for information provided to patient and family  Provisions for Follow-Up Care:  See after visit summary for information related to follow-up care and any pertinent home health orders  Disposition: home    Planned Readmission: none     Discharge Statement:  I spent 45 minutes discharging the patient  This time was spent on the day of discharge  I had direct contact with the patient on the day of discharge  Greater than 50% of the total time was spent examining patient, answering all patient questions, arranging and discussing plan of care with patient as well as directly providing post-discharge instructions  Additional time then spent on discharge activities  Spoke with case management, Geriatrics and surgery    I also spoke over the phone with her family doctor    Discharge Medications:  See after visit summary for reconciled discharge medications provided to patient and/or family        **Please Note: This note may have been constructed using a voice recognition system**

## 2022-11-04 NOTE — DISCHARGE INSTR - AVS FIRST PAGE
Dear Shilpi Sanders,     It was our pleasure to care for you here at [facility], [employer]  It is our hope that we were always able to exceed the expected standards for your care during your stay  You were hospitalized due to elevated LFTs  You were cared for on the 4th floor by Gamaliel Mckeon under the service of Mickey Martínez MD with the [facility] who covers for your primary care physician (PCP), No primary care provider on file  , while you were hospitalized  If you have any questions or concerns related to this hospitalization, you may contact us at [phone]  For follow up as well as any medication refills, we recommend that you follow up with your primary care physician  A registered nurse will reach out to you by phone within a few days after your discharge to answer any additional questions that you may have after going home  However, at this time we provide for you here, the most important instructions / recommendations at discharge:     Notable Medication Adjustments -   Depakote 125 mg twice a day was added to help with behavioral issues; per my discussion with Dr Diego Gregorio please do not start the medicine until she calls you with the updated lab results so we can make sure that her liver is back to normal before adding this medicine  Vitamin L18 and folic acid was added to help with vitamin depletion  Testing Required after Discharge -   Recheck LFTs in 1 week  Important follow up information -   A referral to surgery and Psychiatry was placed for you    You should get a phone call from them to make an appointment  Follow-up with family doctor  Other Instructions -   Follow a low-fat diet   Please review this entire after visit summary as additional general instructions including medication list, appointments, activity, diet, any pertinent wound care, and other additional recommendations from your care team that may be provided for you       Sincerely,     Rafat Wolfe PA-C

## 2022-11-04 NOTE — CONSULTS
Consultation - [address] 50 y o  female MRN: [id number]  Unit/Bed#: W -01 Encounter: [id number]        Inpatient consult to Gerontology  Consult performed by: Jia Caraballo MD  Consult ordered by: Delfino Carrillo PA-C            Assessment/Plan   1 -abdominal pain  -patient noted to have elevated LFTs on admission which have improved patient also noted to have an elevated lipase level which also has improved  Patient abdominal pain has basically resolved  2  -cholelithiasis  -patient has mild gallbladder wall thickening noted on ultrasound  Family inclined to have gallbladder removed in the near future to avoid future problems  They will discuss this with surgery  3  -down syndrome-dimension-behavior problems  -patient might benefit from the addition of Depakote 125 mg orally twice daily  She will need follow-up if this medications started  On talking with family they have been looking for a psychiatric evaluation and regular follow-ups in regards to the prescription of her antipsychotics  I do notice that patient is taking Ativan on a regular basis and this medication can precipitate falls  I suspect this medication will alter her ambulation in the foreseeable future  4  -elevated LFTs  -patient's blood work should be followed until LFTs normalize  Recommendations    1 -Depakote 125 mg orally twice daily    2 -follow-up on blood work until LFTs have normalized    3 -would set up appointment for her to follow-up with psychiatry to handle her antipsychotic medication  With the Jehovah's witness of telemedicine her medications could be adjusted as an outpatient based on patient's behavior             History of Present Illness   Physician Requesting Consult: Ardena Schilder, MD  Reason for Consult / Principal Problem:  Down syndrome-dementia-behavioral issue  Hx and PE limited by:  Patient unable to verbalize mother was able to give me history  Additional history obtained from: Patient's stepmother was able to give me history her father passed away this past Sunday  HPI: Froy Pruitt is a 50y o  year old female who presents to [address] [place] [address] emergency room with a history of abdominal discomfort  Over the last 48 hours the patient has appeared comfortable clutching her abdomen at times  It is not related with meals and patient has had regular bowel movements  Patient has history of Down syndrome and has had behavioral issues in the past   She does have a history of irritable bowel syndrome, incontinence both of bowel and bladder, depression, dementia, and anxiety  Initial laboratory data revealed her electrolytes to be within normal range  It was noted the patient did have elevated LFTs patient's AST was 194 on admission it did improve to 89 on today's blood work  Patient's ALT had been 153 improved to 113 today patient's alk-phos was 351 with improvement to 284 at present  There was no elevation in the total bilirubin patient's lipase levels were elevated initially at 2:05 a m  Today it was 12  Patient's lipids reveal no evidence of hypertriglyceridemia her triglycerides were within normal range at 108 with a cholesterol 134 and LDL of 78  Patient had mild leukocytosis with [place] 235[address] on admission she has no evidence of anemia H&H 13 1-40 5 respectively  Urinalysis revealed trace blood negative for nitrites negative for white cells  Patient had an unenhanced CT of the abdomen revealing evidence of cholelithiasis with some contraction of the gallbladder and mild gallbladder wall thickening the possibility of acute cholecystitis was raced  She also has a sub cm low-attenuation lesion near the fissure for the ligamentous teres too small to characterize  Recommendation for an ultrasound was made  Patient's ultrasound revealed evidence of cholelithiasis with minimal gallbladder wall thickening and mild biliary dilatation    Could not exclude acute cholecystitis  Surgery evaluated patient they felt there was low suspicion for cholecystitis  Patient lives at home with her stepmom patient's father passed away this past Sunday apparently he had active C diff infection  The patient according to the mom gets belligerent at times she has very strong  She likes to touch her stool and then smear it  This behavior has been present throughout her life  She has had a very difficult time trying to find a psychiatrist to help her with her treatments  She has basically been managed by primary care  She does state that the medications that she was given have help ameliorate slightly her behavior  Review of Systems   Constitutional: Negative  HENT: Negative  Eyes: Negative  Respiratory: Negative  Gastrointestinal:        History of stool incontinence  Patient's abdominal pain has resolved   Endocrine: Negative  Genitourinary:        History of urinary incontinence   Neurological: Negative  Hematological: Negative  Psychiatric/Behavioral: Positive for agitation, behavioral problems, confusion and decreased concentration  The patient is nervous/anxious  Memory/Cognitive screening:  Patient with history of Down syndrome her behavior has become more agitated with time  She has a tendency to touch her stool and smear  Mother states that she has very strong  Mobility:  Patient has had fair mobility does not use assistive devices  Falls:  No history of falls  Assistive Devices:  Does not use assistive devices  Fraility:  No frailty  Nutrition/weight loss/grocery shopping/meal preparation:  Nutrition is fair  Vision impairment:  No visual impairment  Hearing impairment:  No hearing impairment  Incontinence:  History of urinary and stool incontinence  Delirium:  Has had delirium  Polypharmacy:   No current facility-administered medications on file prior to encounter       Current Outpatient Medications on File Prior to Encounter   Medication Sig Dispense Refill   • donepezil (ARICEPT) 5 mg tablet Take 5 mg by mouth daily at bedtime     • escitalopram (LEXAPRO) 10 mg tablet Take 10 mg by mouth daily     • LORazepam (ATIVAN) 1 mg tablet Take 0 5 mg by mouth 3 (three) times a day       • OLANZapine (ZyPREXA) 5 mg tablet Take 1 tablet (5 mg total) by mouth 2 (two) times a day 180 tablet 1   • Simethicone (GAS-X PO) Take by mouth if needed          Patients primary residence:  Lives in her own home Lives with:  Lives with mother and she gets additional help 24/7  iADL's:  Patient does not enjoy her instrumental activities of daily living  ADL's:  Patient needs help with her basic activities of daily living    Historical Information   Past medical history:   Past Medical History:   Diagnosis Date   • Anxiety    • Dementia ([facility] [address] )    • Depression    • Down syndrome     non verbal   • Incontinence     bowel/ bladder   • Irritable bowel syndrome      Past surgical history:   Past Surgical History:   Procedure Laterality Date   • COLONOSCOPY     • SKIN LESION EXCISION Right 3/16/2022    Procedure: REMOVE CHEEK & LOWER EYELID  LESIONS;  Surgeon: Melinda Livingston MD;  Location: [facility];  Service: Plastics     Social history:  Social History     Socioeconomic History   • Marital status: Single     Spouse name: Not on file   • Number of children: Not on file   • Years of education: Not on file   • Highest education level: Not on file   Occupational History   • Not on file   Tobacco Use   • Smoking status: Never Smoker   • Smokeless tobacco: Never Used   Vaping Use   • Vaping Use: Never used   Substance and Sexual Activity   • Alcohol use: Never   • Drug use: Never   • Sexual activity: Never   Other Topics Concern   • Not on file   Social History Narrative   • Not on file     Social Determinants of Health     Financial Resource Strain: Not on file   Food Insecurity: Not on file   Transportation Needs: Not on file   Physical Activity: Not on file   Stress: Not on file   Social Connections: Not on file   Intimate Partner Violence: Not on file   Housing Stability: Not on file     Family history: History reviewed  No pertinent family history  Meds/Allergies   All current active meds have been reviewed  Allergies   Allergen Reactions   • Tylenol [Acetaminophen] Other (See Comments)     Gets very excited       Objective   Vitals:    11/04/22 0750   BP: 91/57   Pulse: 72   Resp: 16   Temp:    SpO2: 91%       Intake/Output Summary (Last 24 hours) at 11/4/2022 1110  Last data filed at 11/3/2022 1507  Gross per 24 hour   Intake 1000 ml   Output --   Net 1000 ml     Invasive Devices  Report    Peripheral Intravenous Line  Duration           Peripheral IV 11/03/22 Right Arm <1 day                Physical Exam  Constitutional:       Appearance: She is obese  HENT:      Head: Normocephalic and atraumatic  Right Ear: Ear canal and external ear normal       Left Ear: Ear canal and external ear normal       Nose: Nose normal       Mouth/Throat:      Mouth: Mucous membranes are moist    Eyes:      Pupils: Pupils are equal, round, and reactive to light  Cardiovascular:      Rate and Rhythm: Normal rate and regular rhythm  Pulses: Normal pulses  Heart sounds: Normal heart sounds  Pulmonary:      Breath sounds: Normal breath sounds  Abdominal:      General: Bowel sounds are normal       Palpations: Abdomen is soft  Musculoskeletal:      Cervical back: Neck supple  Skin:     General: Skin is warm and dry  Neurological:      Mental Status: Mental status is at baseline  She is disoriented  Psychiatric:      Comments: Patient can get very agitated  Lab Results:   I have personally reviewed pertinent lab and imaging results       VTE Prophylaxis:  I do not see any DVT prophylaxis at present    Code Status: Level 1 - Full Code  Advance Directive and Living Will:      Power of :    POLST:      Family and Social Support:  Has good support from her stepmother    Living Arrangements: Lives w/ Family members  Support Systems: Family members  Type of Current Residence: Private residence  [address]: No

## 2022-11-04 NOTE — ASSESSMENT & PLAN NOTE
· POA with 2 days of suspected abd pain (pt nonverbal at baseline), but seen clutching her abd  · Labs notable for elevated AST/ALT/ALP; normal bilirubin;  Elevated lipase but not 3x ULN  · CT abd/pelvis showed Cholelithiasis with some contraction of the gallbladder and mild gallbladder wall thickening     · RUQ US with cholelithiasis with minimal GB thickening  · Gen surgery c/s: low suspicion for cholecystitis  · Initially planned for HIDA but patient was unable to tolerate it  · Tolerated clear liquids this morning, advanced diet--recommend following a low-fat diet

## 2022-11-04 NOTE — ASSESSMENT & PLAN NOTE
· Continue Aricept, Lexapro, Ativan, Zyprexa  · Consult to Geriatrics to assist family with support and suggestions regarding behavioral issues  Low-dose Depakote was recommended    Will suggest initiation after follow-up LFTs were obtained to ensure improvement 1st

## 2022-11-04 NOTE — PROGRESS NOTES
Progress Note - General Surgery   Meera Crain 50 y o  female MRN: [id number]  Unit/Bed#: W -01 Encounter: [id number]    Assessment:  Abdominal pain   History of down syndrome, nonverbal at baseline  Mildly elevated LFTs with normal bilirubin    Plan:  • No obvious abdominal pain on exam this morning  • HIDA unable to be completed yesterday as patient would not tolerate procedure  • F/u AM labs -- if improved, can likely advance diet as tolerated  • If pain returns, however, may require repeat attempt at HIDA  • Monitor off abx    Subjective/Objective     Subjective:   No acute events overnight  Resting comfortably this morning  Objective:    Blood pressure 94/60, pulse (!) 52, temperature 99 5 °F (37 5 °C), resp  rate 16, SpO2 95 %, not currently breastfeeding  ,There is no height or weight on file to calculate BMI        Intake/Output Summary (Last 24 hours) at 11/4/2022 0532  Last data filed at 11/3/2022 1507  Gross per 24 hour   Intake 2000 ml   Output --   Net 2000 ml       Invasive Devices  Report    Peripheral Intravenous Line  Duration           Peripheral IV 11/03/22 Right Arm <1 day                Physical Exam:   Gen:  NAD, resting comfortably  CV:  warm, well-perfused  Lungs: nl effort  Abd:  soft, non-distended, no apparent tenderness on exam  Ext:  no CCE  Neuro: nonverbal (baseline)    Results from last 7 days   Lab Units 11/03/22  0424   WBC Thousand/uL 10 53*   HEMOGLOBIN g/dL 13 1   HEMATOCRIT % 40 5   PLATELETS Thousands/uL 189     Results from last 7 days   Lab Units 11/03/22  0424   POTASSIUM mmol/L 3 9   CHLORIDE mmol/L 106   CO2 mmol/L 27   BUN mg/dL 20   CREATININE mg/dL 1 17   CALCIUM mg/dL 8 0*

## 2022-11-09 ENCOUNTER — TELEPHONE (OUTPATIENT)
Dept: PSYCHIATRY | Facility: CLINIC | Age: 47
End: 2022-11-09

## 2022-11-10 LAB
25(OH)D2 SERPL-MCNC: 4.8 NG/ML
25(OH)D3 SERPL-MCNC: 4.6 NG/ML
25(OH)D3+25(OH)D2 SERPL-MCNC: 9.4 NG/ML

## 2023-01-11 ENCOUNTER — OFFICE VISIT (OUTPATIENT)
Dept: INTERNAL MEDICINE CLINIC | Facility: CLINIC | Age: 48
End: 2023-01-11

## 2023-01-11 VITALS — OXYGEN SATURATION: 95 % | HEART RATE: 51 BPM | TEMPERATURE: 97.7 F

## 2023-01-11 DIAGNOSIS — F03.90 DEMENTIA (HCC): ICD-10-CM

## 2023-01-11 DIAGNOSIS — Q90.9 DOWN'S SYNDROME: Primary | ICD-10-CM

## 2023-01-11 RX ORDER — CLONAZEPAM 1 MG/1
TABLET ORAL
COMMUNITY
Start: 2022-12-20

## 2023-01-11 RX ORDER — QUETIAPINE FUMARATE 25 MG/1
TABLET, FILM COATED ORAL
COMMUNITY
Start: 2022-12-14

## 2023-01-11 RX ORDER — LORAZEPAM 0.5 MG/1
TABLET ORAL
COMMUNITY
Start: 2022-12-14

## 2023-01-11 NOTE — PROGRESS NOTES
Assessment/Plan:    Follow up  Down,s  Syndrome   And Dementia     Diagnoses and all orders for this visit:    Down's syndrome    Dementia ([facility] [address] )    Other orders  -     clonazePAM (KlonoPIN) 1 mg tablet  -     LORazepam (ATIVAN) 0 5 mg tablet  -     QUEtiapine (SEROquel) 25 mg tablet          Subjective:      Patient ID: Madisyn Mattson is a 50 y o  female  HPI    The following portions of the patient's history were reviewed and updated as appropriate: allergies, current medications, past family history, past medical history, past social history, past surgical history, and problem list     Review of Systems   Constitutional: Negative  HENT: Negative for dental problem, drooling, ear discharge and ear pain  Eyes: Negative for discharge, redness and itching  Respiratory: Negative for apnea, cough and wheezing  Cardiovascular: Negative for chest pain and palpitations  Gastrointestinal: Negative for abdominal pain, blood in stool, diarrhea and vomiting  Endocrine: Negative for polydipsia, polyphagia and polyuria  Genitourinary: Negative for decreased urine volume, dysuria and frequency  Musculoskeletal: Negative for arthralgias, myalgias and neck stiffness  Skin: Negative for pallor and wound  Allergic/Immunologic: Negative for environmental allergies and food allergies  Neurological: Negative for facial asymmetry, light-headedness, numbness and headaches  Hematological: Negative for adenopathy  Does not bruise/bleed easily  Psychiatric/Behavioral: Negative for agitation, behavioral problems and confusion  Objective:      Pulse (!) 51   Temp 97 7 °F (36 5 °C) (Temporal)   SpO2 95%          Physical Exam  Constitutional:       Appearance: Normal appearance  She is obese  HENT:      Head: Normocephalic  Nose: Nose normal       Mouth/Throat:      Mouth: Mucous membranes are moist    Eyes:      Pupils: Pupils are equal, round, and reactive to light     Cardiovascular: Rate and Rhythm: Regular rhythm  Heart sounds: Normal heart sounds  Pulmonary:      Breath sounds: Normal breath sounds  Abdominal:      Palpations: Abdomen is soft  Musculoskeletal:         General: No swelling  Cervical back: Neck supple  Skin:     General: Skin is warm  Neurological:      General: No focal deficit present  Mental Status: She is alert and oriented to person, place, and time  Psychiatric:         Mood and Affect: Mood normal        Clinically  Stable  On  Current  meds

## 2023-03-24 ENCOUNTER — TELEPHONE (OUTPATIENT)
Dept: INTERNAL MEDICINE CLINIC | Facility: CLINIC | Age: 48
End: 2023-03-24

## 2023-03-28 ENCOUNTER — TELEPHONE (OUTPATIENT)
Dept: PSYCHIATRY | Facility: CLINIC | Age: 48
End: 2023-03-28

## 2023-06-30 ENCOUNTER — TELEMEDICINE (OUTPATIENT)
Dept: INTERNAL MEDICINE CLINIC | Facility: CLINIC | Age: 48
End: 2023-06-30
Payer: MEDICARE

## 2023-06-30 VITALS — WEIGHT: 120 LBS | BODY MASS INDEX: 27.77 KG/M2 | HEIGHT: 55 IN

## 2023-06-30 DIAGNOSIS — Z12.12 ENCOUNTER FOR COLORECTAL CANCER SCREENING: Primary | ICD-10-CM

## 2023-06-30 DIAGNOSIS — Q90.9 DOWN SYNDROME: ICD-10-CM

## 2023-06-30 DIAGNOSIS — E55.9 VITAMIN D INSUFFICIENCY: ICD-10-CM

## 2023-06-30 DIAGNOSIS — F03.90 DEMENTIA, UNSPECIFIED DEMENTIA SEVERITY, UNSPECIFIED DEMENTIA TYPE, UNSPECIFIED WHETHER BEHAVIORAL, PSYCHOTIC, OR MOOD DISTURBANCE OR ANXIETY (HCC): ICD-10-CM

## 2023-06-30 DIAGNOSIS — R35.0 URINARY FREQUENCY: ICD-10-CM

## 2023-06-30 DIAGNOSIS — Z12.11 ENCOUNTER FOR COLORECTAL CANCER SCREENING: Primary | ICD-10-CM

## 2023-06-30 DIAGNOSIS — Z00.00 MEDICARE ANNUAL WELLNESS VISIT, SUBSEQUENT: ICD-10-CM

## 2023-06-30 RX ORDER — LANOLIN ALCOHOL/MO/W.PET/CERES
CREAM (GRAM) TOPICAL
COMMUNITY
Start: 2023-06-16

## 2023-06-30 NOTE — PROGRESS NOTES
Assessment and Plan:     Problem List Items Addressed This Visit        Nervous and Auditory    Dementia ([facility] [address] )    Relevant Orders    CBC and differential    Comprehensive metabolic panel    Vitamin D 25 hydroxy    TSH, 3rd generation with Free T4 reflex       Other    Down syndrome    Relevant Orders    CBC and differential    Comprehensive metabolic panel    Vitamin D 25 hydroxy    TSH, 3rd generation with Free T4 reflex   Other Visit Diagnoses     Encounter for colorectal cancer screening    -  Primary    Relevant Orders    Cologuard    Vitamin D insufficiency        Relevant Orders    Vitamin D 25 hydroxy    Medicare annual wellness visit, subsequent        Urinary frequency        Relevant Orders    UA w Reflex to Microscopic w Reflex to Culture        BMI Counseling: Body mass index is 31 2 kg/m²  Follow-up plan was not completed due to elderly patient (70 years old) where weight reduction/weight gain would complicate underlying health condition such as: illness or physical disability and mental illness, dementia, or confusion  Depression Screening and Follow-up Plan: Patient was screened for depression during today's encounter  They screened negative with a PHQ-2 score of 0  Preventive health issues were discussed with patient, and age appropriate screening tests were ordered as noted in patient's After Visit Summary  Personalized health advice and appropriate referrals for health education or preventive services given if needed, as noted in patient's After Visit Summary       History of Present Illness:     Patient presents for a Medicare Wellness Visit    Pt is non verbal, encounter was conducted with the help of the mother      No care team member to display     Review of Systems:     Review of Systems   Unable to perform ROS: Patient nonverbal        Problem List:     Patient Active Problem List   Diagnosis   • Down syndrome   • Dementia ([facility] [address] )   • Anxiety   • Acute respiratory failure with hypoxia (HCC)   • Elevated MCV   • Aspiration into airway   • Choking   • Abdominal pain   • Elevated LFTs      Past Medical and Surgical History:     Past Medical History:   Diagnosis Date   • Anxiety    • Dementia ([address] )    • Depression    • Down syndrome     non verbal   • Incontinence     bowel/ bladder   • Irritable bowel syndrome      Past Surgical History:   Procedure Laterality Date   • COLONOSCOPY     • SKIN LESION EXCISION Right 3/16/2022    Procedure: REMOVE CHEEK & LOWER EYELID  LESIONS;  Surgeon: Jonathan Webster MD;  Location: [facility] MAIN OR;  Service: Plastics      Family History:     History reviewed  No pertinent family history  Social History:     Social History     Socioeconomic History   • Marital status: Single     Spouse name: None   • Number of children: None   • Years of education: None   • Highest education level: None   Occupational History   • None   Tobacco Use   • Smoking status: Never   • Smokeless tobacco: Never   Vaping Use   • Vaping Use: Never used   Substance and Sexual Activity   • Alcohol use: Never   • Drug use: Never   • Sexual activity: Never   Other Topics Concern   • None   Social History Narrative   • None     Social Determinants of Health     Financial Resource Strain: Low Risk  (6/30/2023)    Overall Financial Resource Strain (CARDIA)    • Difficulty of Paying Living Expenses: Not hard at all   Food Insecurity: Not on file   Transportation Needs: Unmet Transportation Needs (6/30/2023)    PRAPARE - Transportation    • Lack of Transportation (Medical):  Yes    • Lack of Transportation (Non-Medical): Yes   Physical Activity: Not on file   Stress: Not on file   Social Connections: Not on file   Intimate Partner Violence: Not on file   Housing Stability: Not on file      Medications and Allergies:     Current Outpatient Medications   Medication Sig Dispense Refill   • clonazePAM (KlonoPIN) 1 mg tablet      • divalproex sodium (Depakote) 125 mg EC tablet Take 1 tablet (125 mg total) by mouth every 12 (twelve) hours Do not start before November 5, 2022  60 tablet 0   • donepezil (ARICEPT) 5 mg tablet Take 5 mg by mouth daily at bedtime     • escitalopram (LEXAPRO) 10 mg tablet Take 10 mg by mouth daily     • LORazepam (ATIVAN) 1 mg tablet Take 0 5 mg by mouth 3 (three) times a day     • melatonin 3 mg      • OLANZapine (ZyPREXA) 5 mg tablet Take 1 tablet (5 mg total) by mouth 2 (two) times a day 180 tablet 1   • QUEtiapine (SEROquel) 25 mg tablet      • Simethicone (GAS-X PO) Take by mouth if needed       • vitamin B-12 (CYANOCOBALAMIN) 2000 MCG TABS Take 1 tablet (2,000 mcg total) by mouth daily 30 tablet 0   • folic acid (FOLVITE) 1 mg tablet Take 1 tablet (1 mg total) by mouth daily (Patient not taking: Reported on 6/30/2023) 30 tablet 0   • LORazepam (ATIVAN) 0 5 mg tablet  (Patient not taking: Reported on 6/30/2023)       No current facility-administered medications for this visit  Allergies   Allergen Reactions   • Tylenol [Acetaminophen] Other (See Comments)     Gets very excited      Immunizations:     Immunization History   Administered Date(s) Administered   • COVID-19 MODERNA VACC 0 5 ML IM 01/22/2021, 02/18/2021, 11/16/2021, 12/06/2022   • INFLUENZA 10/21/2021, 10/24/2022      Health Maintenance:         Topic Date Due   • Hepatitis C Screening  Never done   • HIV Screening  Never done   • Cervical Cancer Screening  Never done   • Breast Cancer Screening: Mammogram  Never done   • Colorectal Cancer Screening  Never done         Topic Date Due   • COVID-19 Vaccine (5 - Moderna series) 01/31/2023      Medicare Screening Tests and Risk Assessments:     Agueda Cedeno is here for her Subsequent Wellness visit  Last Medicare Wellness visit information reviewed, patient interviewed and updates made to the record today  Historian  Patient cannot answer questions due to cognitive impairment, intelluctual disability, or expressive limitations   Information provided by: family  Health Risk Assessment:   Patient rates overall health as good  Patient feels that their physical health rating is slightly better  Patient is satisfied with their life  Eyesight was rated as same  Hearing was rated as same  Patient feels that their emotional and mental health rating is same  Patients states they are sometimes angry  Patient states they are never, rarely unusually tired/fatigued  Pain experienced in the last 7 days has been none  Patient states that she has experienced no weight loss or gain in last 6 months  Depression Screening:   PHQ-2 Score: 0      Fall Risk Screening: In the past year, patient has experienced: no history of falling in past year      Urinary Incontinence Screening:   Patient has leaked urine accidently in the last six months  Pt is incontinent     Home Safety:  Patient does not have trouble with stairs inside or outside of their home  Patient has working smoke alarms and has working carbon monoxide detector  Home safety hazards include: none  Nutrition:   Current diet is Regular  [place] diet   Gluten free  Lactose free     Medications:   Patient is currently taking over-the-counter supplements  OTC medications include: see medication list  Patient is not able to manage medications  Family manages medication     Activities of Daily Living (ADLs)/Instrumental Activities of Daily Living (IADLs):   Walk and transfer into and out of bed and chair?: Yes  Dress and groom yourself?: No    Bathe or shower yourself?: No    Feed yourself? No  Do your laundry/housekeeping?: No  Manage your money, pay your bills and track your expenses?: No  Make your own meals?: No    Do your own shopping?: No    Previous Hospitalizations:   Any hospitalizations or ED visits within the last 12 months?: Yes    How many hospitalizations have you had in the last year?: 1-2    Advance Care Planning:   Living will: No    Durable POA for healthcare:  Yes    Advanced directive: No "    PREVENTIVE SCREENINGS      Cardiovascular Screening:    General: Screening Current      Diabetes Screening:     General: Screening Current      Lung Cancer Screening:     General: Screening Not Indicated    Screening, Brief Intervention, and Referral to Treatment (SBIRT)    Screening  Typical number of drinks in a day: 0  Typical number of drinks in a week: 0  Interpretation: Low risk drinking behavior  Single Item Drug Screening:  How often have you used an illegal drug (including marijuana) or a prescription medication for non-medical reasons in the past year? never    Single Item Drug Screen Score: 0  Interpretation: Negative screen for possible drug use disorder    No results found  Physical Exam:     Ht 4' 4\" (1 321 m)   Wt 54 4 kg (120 lb)   BMI 31 20 kg/m²     Physical Exam     Bhargav Forbes MD Virtual AWV Consent    Verification of patient location:    Patient is located at Other in the following state in which I hold an active license [place]    The patient was identified by name and date of birth  Rylee Butt was informed that this is a telemedicine visit and that the visit is being conducted through the telephone  She agrees to proceed     My office door was closed  No one else was in the room  She acknowledged consent and understanding of privacy and security of the video platform  The patient has agreed to participate and understands they can discontinue the visit at any time  Patient is aware this is a billable service  Reason for visit is AWV    Encounter provider Bhargav Forbes MD    Provider located at [address] [address] [phone]      Recent Visits  No visits were found meeting these conditions    Showing recent visits within past 7 days and meeting all other requirements  Today's Visits  Date Type Provider Dept   06/30/23 Telemedicine Bhargav Forbes MD " [address] today's visits and meeting all other requirements  Future Appointments  No visits were found meeting these conditions    Showing future appointments within next 150 days and meeting all other requirements           Visit Time  Total Visit Duration: 15

## 2023-06-30 NOTE — PATIENT INSTRUCTIONS
Medicare Preventive Visit Patient Instructions  Thank you for completing your Welcome to Medicare Visit or Medicare Annual Wellness Visit today  Your next wellness visit will be due in one year (6/30/2024)  The screening/preventive services that you may require over the next 5-10 years are detailed below  Some tests may not apply to you based off risk factors and/or age  Screening tests ordered at today's visit but not completed yet may show as past due  Also, please note that scanned in results may not display below  Preventive Screenings:  Service Recommendations Previous Testing/Comments   Colorectal Cancer Screening  * Colonoscopy    * Fecal Occult Blood Test (FOBT)/Fecal Immunochemical Test (FIT)  * Fecal DNA/Cologuard Test  * Flexible Sigmoidoscopy Age: 39-70 years old   Colonoscopy: every 10 years (may be performed more frequently if at higher risk)  OR  FOBT/FIT: every 1 year  OR  Cologuard: every 3 years  OR  Sigmoidoscopy: every 5 years  Screening may be recommended earlier than age 39 if at higher risk for colorectal cancer  Also, an individualized decision between you and your healthcare provider will decide whether screening between the ages of 74-80 would be appropriate  Colonoscopy: Not on file  FOBT/FIT: Not on file  Cologuard: Not on file  Sigmoidoscopy: Not on file          Breast Cancer Screening Age: 36 years old  Frequency: every 1-2 years  Not required if history of left and right mastectomy Mammogram: Not on file        Cervical Cancer Screening Between the ages of 21-29, pap smear recommended once every 3 years  Between the ages of 33-67, can perform pap smear with HPV co-testing every 5 years     Recommendations may differ for women with a history of total hysterectomy, cervical cancer, or abnormal pap smears in past  Pap Smear: Not on file        Hepatitis C Screening Once for adults born between [facility]  More frequently in patients at high risk for Hepatitis C Hep C Antibody: Not on file        Diabetes Screening 1-2 times per year if you're at risk for diabetes or have pre-diabetes Fasting glucose: 72 mg/dL (11/4/2022)  A1C: 5 2 % (4/12/2021)  Screening Current   Cholesterol Screening Once every 5 years if you don't have a lipid disorder  May order more often based on risk factors  Lipid panel: 11/04/2022    Screening Current     Other Preventive Screenings Covered by Medicare:  1  Abdominal Aortic Aneurysm (AAA) Screening: covered once if your at risk  You're considered to be at risk if you have a family history of AAA  2  Lung Cancer Screening: covers low dose CT scan once per year if you meet all of the following conditions: (1) Age 50-69; (2) No signs or symptoms of lung cancer; (3) Current smoker or have quit smoking within the last 15 years; (4) You have a tobacco smoking history of at least 20 pack years (packs per day multiplied by number of years you smoked); (5) You get a written order from a healthcare provider  3  Glaucoma Screening: covered annually if you're considered high risk: (1) You have diabetes OR (2) Family history of glaucoma OR (3)  aged 48 and older OR (3)  American aged 72 and older  3  Osteoporosis Screening: covered every 2 years if you meet one of the following conditions: (1) You're estrogen deficient and at risk for osteoporosis based off medical history and other findings; (2) Have a vertebral abnormality; (3) On glucocorticoid therapy for more than 3 months; (4) Have primary hyperparathyroidism; (5) On osteoporosis medications and need to assess response to drug therapy  · Last bone density test (DXA Scan): Not on file  5  HIV Screening: covered annually if you're between the age of 12-76  Also covered annually if you are younger than 13 and older than 72 with risk factors for HIV infection  For pregnant patients, it is covered up to 3 times per pregnancy      Immunizations:  Immunization Recommendations   Influenza Vaccine Annual influenza vaccination during flu season is recommended for all persons aged >= 6 months who do not have contraindications   Pneumococcal Vaccine   * Pneumococcal conjugate vaccine = PCV13 (Prevnar 13), PCV15 (Vaxneuvance), PCV20 (Prevnar 20)  * Pneumococcal polysaccharide vaccine = PPSV23 (Pneumovax) Adults 25-60 years old: 1-3 doses may be recommended based on certain risk factors  Adults 72 years old: 1-2 doses may be recommended based off what pneumonia vaccine you previously received   Hepatitis B Vaccine 3 dose series if at intermediate or high risk (ex: diabetes, end stage renal disease, liver disease)   Tetanus (Td) Vaccine - COST NOT COVERED BY MEDICARE PART B Following completion of primary series, a booster dose should be given every 10 years to maintain immunity against tetanus  Td may also be given as tetanus wound prophylaxis  Tdap Vaccine - COST NOT COVERED BY MEDICARE PART B Recommended at least once for all adults  For pregnant patients, recommended with each pregnancy  Shingles Vaccine (Shingrix) - COST NOT COVERED BY MEDICARE PART B  2 shot series recommended in those aged 48 and above     Health Maintenance Due:      Topic Date Due   • Hepatitis C Screening  Never done   • HIV Screening  Never done   • Cervical Cancer Screening  Never done   • Breast Cancer Screening: Mammogram  Never done   • Colorectal Cancer Screening  Never done     Immunizations Due:      Topic Date Due   • COVID-19 Vaccine (5 - Moderna series) 01/31/2023     Advance Directives   What are advance directives? Advance directives are legal documents that state your wishes and plans for medical care  These plans are made ahead of time in case you lose your ability to make decisions for yourself  Advance directives can apply to any medical decision, such as the treatments you want, and if you want to donate organs  What are the types of advance directives?   There are many types of advance directives, and each state has rules about how to use them  You may choose a combination of any of the following:  · Living will: This is a written record of the treatment you want  You can also choose which treatments you do not want, which to limit, and which to stop at a certain time  This includes surgery, medicine, IV fluid, and tube feedings  · Durable power of  for healthcare [facility]): This is a written record that states who you want to make healthcare choices for you when you are unable to make them for yourself  This person, called a proxy, is usually a family member or a friend  You may choose more than 1 proxy  · Do not resuscitate (DNR) order:  A DNR order is used in case your heart stops beating or you stop breathing  It is a request not to have certain forms of treatment, such as CPR  A DNR order may be included in other types of advance directives  · Medical directive: This covers the care that you want if you are in a coma, near death, or unable to make decisions for yourself  You can list the treatments you want for each condition  Treatment may include pain medicine, surgery, blood transfusions, dialysis, IV or tube feedings, and a ventilator (breathing machine)  · Values history: This document has questions about your views, beliefs, and how you feel and think about life  This information can help others choose the care that you would choose  Why are advance directives important? An advance directive helps you control your care  Although spoken wishes may be used, it is better to have your wishes written down  Spoken wishes can be misunderstood, or not followed  Treatments may be given even if you do not want them  An advance directive may make it easier for your family to make difficult choices about your care  Urinary Incontinence   Urinary incontinence (UI)  is when you lose control of your bladder  UI develops because your bladder cannot store or empty urine properly   The 3 most common types of UI are stress incontinence, urge incontinence, or both  Medicines:   · May be given to help strengthen your bladder control  Report any side effects of medication to your healthcare provider  Do pelvic muscle exercises often:  Your pelvic muscles help you stop urinating  Squeeze these muscles tight for 5 seconds, then relax for 5 seconds  Gradually work up to squeezing for 10 seconds  Do 3 sets of 15 repetitions a day, or as directed  This will help strengthen your pelvic muscles and improve bladder control  Train your bladder:  Go to the bathroom at set times, such as every 2 hours, even if you do not feel the urge to go  You can also try to hold your urine when you feel the urge to go  For example, hold your urine for 5 minutes when you feel the urge to go  As that becomes easier, hold your urine for 10 minutes  Self-care:   · Keep a UI record  Write down how often you leak urine and how much you leak  Make a note of what you were doing when you leaked urine  · Drink liquids as directed  You may need to limit the amount of liquid you drink to help control your urine leakage  Do not drink any liquid right before you go to bed  Limit or do not have drinks that contain caffeine or alcohol  · Prevent constipation  Eat a variety of high-fiber foods  Good examples are high-fiber cereals, beans, vegetables, and whole-grain breads  Walking is the best way to trigger your intestines to have a bowel movement  · Exercise regularly and maintain a healthy weight  Weight loss and exercise will decrease pressure on your bladder and help you control your leakage  · Use a catheter as directed  to help empty your bladder  A catheter is a tiny, plastic tube that is put into your bladder to drain your urine  · Go to behavior therapy as directed  Behavior therapy may be used to help you learn to control your urge to urinate      Weight Management   Why it is important to manage your weight:  Being overweight increases your risk of health conditions such as heart disease, high blood pressure, type 2 diabetes, and certain types of cancer  It can also increase your risk for osteoarthritis, sleep apnea, and other respiratory problems  Aim for a slow, steady weight loss  Even a small amount of weight loss can lower your risk of health problems  How to lose weight safely:  A safe and healthy way to lose weight is to eat fewer calories and get regular exercise  You can lose up about 1 pound a week by decreasing the number of calories you eat by 500 calories each day  Healthy meal plan for weight management:  A healthy meal plan includes a variety of foods, contains fewer calories, and helps you stay healthy  A healthy meal plan includes the following:  · Eat whole-grain foods more often  A healthy meal plan should contain fiber  Fiber is the part of grains, fruits, and vegetables that is not broken down by your body  Whole-grain foods are healthy and provide extra fiber in your diet  Some examples of whole-grain foods are whole-wheat breads and pastas, oatmeal, brown rice, and bulgur  · Eat a variety of vegetables every day  Include dark, leafy greens such as spinach, kale, alek greens, and mustard greens  Eat yellow and orange vegetables such as carrots, sweet potatoes, and winter squash  · Eat a variety of fruits every day  Choose fresh or canned fruit (canned in its own juice or light syrup) instead of juice  Fruit juice has very little or no fiber  · Eat low-fat dairy foods  Drink fat-free (skim) milk or 1% milk  Eat fat-free yogurt and low-fat cottage cheese  Try low-fat cheeses such as mozzarella and other reduced-fat cheeses  · Choose meat and other protein foods that are low in fat  Choose beans or other legumes such as split peas or lentils  Choose fish, skinless poultry (chicken or turkey), or lean cuts of red meat (beef or pork)  Before you cook meat or poultry, cut off any visible fat     · Use less fat and oil   Try baking foods instead of frying them  Add less fat, such as margarine, sour cream, regular salad dressing and mayonnaise to foods  Eat fewer high-fat foods  Some examples of high-fat foods include french fries, doughnuts, ice cream, and cakes  · Eat fewer sweets  Limit foods and drinks that are high in sugar  This includes candy, cookies, regular soda, and sweetened drinks  Exercise:  Exercise at least 30 minutes per day on most days of the week  Some examples of exercise include walking, biking, dancing, and swimming  You can also fit in more physical activity by taking the stairs instead of the elevator or parking farther away from stores  Ask your healthcare provider about the best exercise plan for you  © Copyright [employer] 2018 Information is for End User's use only and may not be sold, redistributed or otherwise used for commercial purposes   All illustrations and images included in CareNotes® are the copyrighted property of [employer]  or [address] [place] [address]

## 2023-07-26 LAB — COLOGUARD RESULT REPORTABLE: NEGATIVE

## 2023-10-19 ENCOUNTER — TELEPHONE (OUTPATIENT)
Dept: INTERNAL MEDICINE CLINIC | Facility: CLINIC | Age: 48
End: 2023-10-19

## 2023-10-19 NOTE — TELEPHONE ENCOUNTER
[facility] is unable to do UA because pt is incontinent and wears a brief. She cannot give sample in a urine cup. They can get a sample from the brief if you put in order for UA PCR, they will come to her home to collect since she is homebound.  She says if order is received today they can have results done by tomorrow

## 2023-10-25 DIAGNOSIS — Q90.9 DOWN SYNDROME: ICD-10-CM

## 2023-10-25 DIAGNOSIS — R35.0 URINARY FREQUENCY: Primary | ICD-10-CM

## 2023-10-25 DIAGNOSIS — F03.B0 MODERATE DEMENTIA, UNSPECIFIED DEMENTIA TYPE, UNSPECIFIED WHETHER BEHAVIORAL, PSYCHOTIC, OR MOOD DISTURBANCE OR ANXIETY (HCC): ICD-10-CM

## 2023-11-08 DIAGNOSIS — N39.0 URINARY TRACT INFECTION WITHOUT HEMATURIA, SITE UNSPECIFIED: Primary | ICD-10-CM

## 2023-11-08 RX ORDER — NITROFURANTOIN 25; 75 MG/1; MG/1
100 CAPSULE ORAL 2 TIMES DAILY
Qty: 10 CAPSULE | Refills: 0 | Status: SHIPPED | OUTPATIENT
Start: 2023-11-08 | End: 2023-11-13

## 2023-11-29 DIAGNOSIS — Z01.89 ENCOUNTER FOR COMPETENCY EVALUATION: Primary | ICD-10-CM

## 2024-01-10 ENCOUNTER — OFFICE VISIT (OUTPATIENT)
Dept: INTERNAL MEDICINE CLINIC | Facility: CLINIC | Age: 49
End: 2024-01-10
Payer: MEDICARE

## 2024-01-10 VITALS
HEART RATE: 94 BPM | BODY MASS INDEX: 31.2 KG/M2 | SYSTOLIC BLOOD PRESSURE: 100 MMHG | DIASTOLIC BLOOD PRESSURE: 65 MMHG | OXYGEN SATURATION: 98 % | TEMPERATURE: 97.4 F | HEIGHT: 55 IN

## 2024-01-10 DIAGNOSIS — Z23 IMMUNIZATION DUE: ICD-10-CM

## 2024-01-10 DIAGNOSIS — Q90.9 DOWN SYNDROME: ICD-10-CM

## 2024-01-10 DIAGNOSIS — F32.A DEPRESSION, UNSPECIFIED DEPRESSION TYPE: ICD-10-CM

## 2024-01-10 DIAGNOSIS — E78.9 LIPID DISORDER: ICD-10-CM

## 2024-01-10 DIAGNOSIS — Z00.00 MEDICARE ANNUAL WELLNESS VISIT, SUBSEQUENT: Primary | ICD-10-CM

## 2024-01-10 DIAGNOSIS — E55.9 VITAMIN D INSUFFICIENCY: ICD-10-CM

## 2024-01-10 PROCEDURE — G0439 PPPS, SUBSEQ VISIT: HCPCS | Performed by: INTERNAL MEDICINE

## 2024-01-10 RX ORDER — ZOSTER VACCINE RECOMBINANT, ADJUVANTED 50 MCG/0.5
0.5 KIT INTRAMUSCULAR ONCE
Qty: 1 EACH | Refills: 1 | Status: SHIPPED | OUTPATIENT
Start: 2024-01-10 | End: 2024-01-10

## 2024-01-10 NOTE — PROGRESS NOTES
Assessment and Plan:     Problem List Items Addressed This Visit       Down syndrome    Relevant Orders    CBC and differential    Comprehensive metabolic panel    Lipid panel     Other Visit Diagnoses       Medicare annual wellness visit, subsequent    -  Primary    Immunization due        Relevant Medications    Zoster Vac Recomb Adjuvanted (Shingrix) 50 MCG/0.5ML SUSR    Other Relevant Orders    CBC and differential    Comprehensive metabolic panel    Lipid panel    Vitamin D insufficiency        Relevant Orders    CBC and differential    Comprehensive metabolic panel    Lipid panel    Depression, unspecified depression type        Relevant Orders    CBC and differential    Comprehensive metabolic panel    Lipid panel    Lipid disorder        Relevant Orders    CBC and differential    Comprehensive metabolic panel    Lipid panel             Preventive health issues were discussed with patient, and age appropriate screening tests were ordered as noted in patient's After Visit Summary.  Personalized health advice and appropriate referrals for health education or preventive services given if needed, as noted in patient's After Visit Summary.     History of Present Illness:     Patient presents for a Medicare Wellness Visit    HPI   Patient Care Team:  Juni Toney MD as PCP - General (Internal Medicine)     Review of Systems:     Review of Systems   Unable to perform ROS: Mental status change        Problem List:     Patient Active Problem List   Diagnosis    Down syndrome    Dementia (HCC)    Anxiety    Acute respiratory failure with hypoxia (HCC)    Elevated MCV    Aspiration into airway    Choking    Abdominal pain    Elevated LFTs      Past Medical and Surgical History:     Past Medical History:   Diagnosis Date    Anxiety     Dementia (HCC)     Depression     Down syndrome     non verbal    Incontinence     bowel/ bladder    Irritable bowel syndrome      Past Surgical History:   Procedure Laterality Date     COLONOSCOPY      SKIN LESION EXCISION Right 3/16/2022    Procedure: REMOVE CHEEK & LOWER EYELID  LESIONS;  Surgeon: Prateek Torres MD;  Location:  MAIN OR;  Service: Plastics      Family History:     History reviewed. No pertinent family history.   Social History:     Social History     Socioeconomic History    Marital status: Single     Spouse name: None    Number of children: None    Years of education: None    Highest education level: None   Occupational History    None   Tobacco Use    Smoking status: Never    Smokeless tobacco: Never   Vaping Use    Vaping status: Never Used   Substance and Sexual Activity    Alcohol use: Never    Drug use: Never    Sexual activity: Never   Other Topics Concern    None   Social History Narrative    None     Social Determinants of Health     Financial Resource Strain: Low Risk  (1/10/2024)    Overall Financial Resource Strain (CARDIA)     Difficulty of Paying Living Expenses: Not hard at all   Food Insecurity: Not on file   Transportation Needs: No Transportation Needs (1/10/2024)    PRAPARE - Transportation     Lack of Transportation (Medical): No     Lack of Transportation (Non-Medical): No   Physical Activity: Not on file   Stress: Not on file   Social Connections: Not on file   Intimate Partner Violence: Not on file   Housing Stability: Not on file      Medications and Allergies:     Current Outpatient Medications   Medication Sig Dispense Refill    clonazePAM (KlonoPIN) 1 mg tablet       divalproex sodium (Depakote) 125 mg EC tablet Take 1 tablet (125 mg total) by mouth every 12 (twelve) hours Do not start before November 5, 2022. 60 tablet 0    donepezil (ARICEPT) 5 mg tablet Take 5 mg by mouth daily at bedtime      escitalopram (LEXAPRO) 10 mg tablet Take 10 mg by mouth daily      LORazepam (ATIVAN) 1 mg tablet Take 0.5 mg by mouth 3 (three) times a day      melatonin 3 mg       OLANZapine (ZyPREXA) 5 mg tablet Take 1 tablet (5 mg total) by mouth 2 (two) times a  day 180 tablet 1    QUEtiapine (SEROquel) 25 mg tablet       Simethicone (GAS-X PO) Take by mouth if needed        vitamin B-12 (CYANOCOBALAMIN) 2000 MCG TABS Take 1 tablet (2,000 mcg total) by mouth daily 30 tablet 0    Zoster Vac Recomb Adjuvanted (Shingrix) 50 MCG/0.5ML SUSR Inject 0.5 mL into a muscle once for 1 dose Repeat dose in 2 to 6 months 1 each 1    folic acid (FOLVITE) 1 mg tablet Take 1 tablet (1 mg total) by mouth daily (Patient not taking: Reported on 6/30/2023) 30 tablet 0    LORazepam (ATIVAN) 0.5 mg tablet  (Patient not taking: Reported on 6/30/2023)       No current facility-administered medications for this visit.     Allergies   Allergen Reactions    Tylenol [Acetaminophen] Other (See Comments)     Gets very excited      Immunizations:     Immunization History   Administered Date(s) Administered    COVID-19 MODERNA VACC 0.5 ML IM 01/22/2021, 02/18/2021, 11/16/2021, 12/06/2022    INFLUENZA 10/21/2021, 10/24/2022      Health Maintenance:         Topic Date Due    Hepatitis C Screening  Never done    HIV Screening  Never done    Cervical Cancer Screening  Never done    Breast Cancer Screening: Mammogram  Never done    Colorectal Cancer Screening  07/17/2026         Topic Date Due    Influenza Vaccine (1) 09/01/2023    COVID-19 Vaccine (5 - 2023-24 season) 09/01/2023      Medicare Screening Tests and Risk Assessments:     Amrita is here for her Subsequent Wellness visit. Last Medicare Wellness visit information reviewed, patient interviewed and updates made to the record today.      Historian  Patient cannot answer questions due to cognitive impairment, intelluctual disability, or expressive limitations. Information provided by: family.    Health Risk Assessment:   Patient rates overall health as excellent. Patient feels that their physical health rating is same. Eyesight was rated as same. Hearing was rated as same. Patient feels that their emotional and mental health rating is same. Pain  experienced in the last 7 days has been none. Patient states that she has experienced no weight loss or gain in last 6 months.     Fall Risk Screening:   In the past year, patient has experienced: no history of falling in past year      Urinary Incontinence Screening:   Patient has leaked urine accidently in the last six months.     Home Safety:  Patient does not have trouble with stairs inside or outside of their home. Patient has working smoke alarms and has working carbon monoxide detector. Home safety hazards include: none.     Nutrition:   Current diet is Regular. Gluten free diet    Medications:   Patient is currently taking over-the-counter supplements. OTC medications include: see medication list. Patient is not able to manage medications.     Activities of Daily Living (ADLs)/Instrumental Activities of Daily Living (IADLs):   Walk and transfer into and out of bed and chair?: No  Dress and groom yourself?: No    Bathe or shower yourself?: No    Feed yourself? No  Do your laundry/housekeeping?: No  Manage your money, pay your bills and track your expenses?: No  Make your own meals?: No    Do your own shopping?: No    Previous Hospitalizations:   Any hospitalizations or ED visits within the last 12 months?: No      Advance Care Planning:   Living will: No    Durable POA for healthcare: Yes    Advanced directive: No    ACP document given: Yes      Cognitive Screening:   Provider or family/friend/caregiver concerned regarding cognition?: No    PREVENTIVE SCREENINGS      Cardiovascular Screening:    General: Screening Current      Colorectal Cancer Screening:     General: Screening Current      Breast Cancer Screening:     General: Patient Declines      Cervical Cancer Screening:    General: Patient Declines      Osteoporosis Screening:    General: Patient Declines      Abdominal Aortic Aneurysm (AAA) Screening:        General: Screening Not Indicated      Lung Cancer Screening:     General: Screening Not  "Indicated      Hepatitis C Screening:    General: Patient Declines    Screening, Brief Intervention, and Referral to Treatment (SBIRT)    Screening  Typical number of drinks in a day: 0  Typical number of drinks in a week: 0  Interpretation: Low risk drinking behavior.    Single Item Drug Screening:  How often have you used an illegal drug (including marijuana) or a prescription medication for non-medical reasons in the past year? never    Single Item Drug Screen Score: 0  Interpretation: Negative screen for possible drug use disorder    No results found.     Physical Exam:     /65 (BP Location: Left arm, Patient Position: Sitting, Cuff Size: Adult)   Pulse 94   Temp (!) 97.4 °F (36.3 °C) (Temporal)   Ht 4' 4\" (1.321 m)   SpO2 98%   BMI 31.20 kg/m²     Physical Exam  Constitutional:       General: She is not in acute distress.     Appearance: Normal appearance. She is normal weight. She is not ill-appearing, toxic-appearing or diaphoretic.   Cardiovascular:      Rate and Rhythm: Normal rate and regular rhythm.      Pulses: Normal pulses.      Heart sounds: Normal heart sounds. No murmur heard.     No gallop.   Pulmonary:      Effort: Pulmonary effort is normal. No respiratory distress.      Breath sounds: Normal breath sounds. No stridor. No wheezing, rhonchi or rales.   Chest:      Chest wall: No tenderness.   Neurological:      Mental Status: She is alert.          Juni Toney MD  "

## 2024-04-01 ENCOUNTER — TELEPHONE (OUTPATIENT)
Dept: PSYCHIATRY | Facility: CLINIC | Age: 49
End: 2024-04-01

## 2024-04-01 NOTE — TELEPHONE ENCOUNTER
Reached out to patient regarding Neuropsychology referral,spoke with patient's mother informed pt will need to be referred out, unfortunately at this time provider can not accommodate what referring provider is looking for. Writer  provided outside resources who may be able to better assist via email sent to [email or web address]    Closed referral .

## 2025-01-08 ENCOUNTER — RA CDI HCC (OUTPATIENT)
Dept: OTHER | Facility: HOSPITAL | Age: 50
End: 2025-01-08

## 2025-01-15 ENCOUNTER — OFFICE VISIT (OUTPATIENT)
Dept: INTERNAL MEDICINE CLINIC | Facility: CLINIC | Age: 50
End: 2025-01-15
Payer: MEDICARE

## 2025-01-15 VITALS
SYSTOLIC BLOOD PRESSURE: 116 MMHG | DIASTOLIC BLOOD PRESSURE: 78 MMHG | TEMPERATURE: 97 F | BODY MASS INDEX: 30.16 KG/M2 | OXYGEN SATURATION: 96 % | WEIGHT: 116 LBS | HEART RATE: 64 BPM

## 2025-01-15 DIAGNOSIS — F03.90 DEMENTIA (HCC): ICD-10-CM

## 2025-01-15 DIAGNOSIS — E78.5 HYPERLIPIDEMIA: ICD-10-CM

## 2025-01-15 DIAGNOSIS — Z00.00 MEDICARE ANNUAL WELLNESS VISIT, SUBSEQUENT: ICD-10-CM

## 2025-01-15 DIAGNOSIS — D64.9 ANEMIA: Primary | ICD-10-CM

## 2025-01-15 DIAGNOSIS — J96.01 ACUTE RESPIRATORY FAILURE WITH HYPOXIA (HCC): ICD-10-CM

## 2025-01-15 PROCEDURE — G0439 PPPS, SUBSEQ VISIT: HCPCS | Performed by: INTERNAL MEDICINE

## 2025-01-15 NOTE — PROGRESS NOTES
Name: Amrita Reyes      : 1975      MRN: [id number]  Encounter Provider: Prashanth Bhagat MD  Encounter Date: 1/15/2025   Encounter department: [facility] INTERNAL MEDICINE [facility]    Assessment & Plan  Hyperlipidemia    Orders:  •  Comprehensive metabolic panel; Future  •  Lipid panel; Future    Anemia    Orders:  •  CBC (Includes Diff/Plt) (Refl); Future    Acute respiratory failure with hypoxia (HCC)         Dementia (HCC)         Medicare annual wellness visit, subsequent       Preventive health issues were discussed with patient, and age appropriate screening tests were ordered as noted in patient's After Visit Summary. Personalized health advice and appropriate referrals for health education or preventive services given if needed, as noted in patient's After Visit Summary.    History of Present Illness     HPI   Patient Care Team:  Prashanth Bhagat MD as PCP - General (Internal Medicine)    Review of Systems  Medical History Reviewed by provider this encounter:       Annual Wellness Visit Questionnaire   Amrita is here for her Subsequent Wellness visit.     Historian  Patient cannot answer questions due to cognitive impairment, intelluctual disability, or expressive limitations. Information provided by: family.    Health Risk Assessment:   Patient rates overall health as very good. Patient feels that their physical health rating is same. Patient is satisfied with their life. Eyesight was rated as same. Hearing was rated as same. Patient feels that their emotional and mental health rating is same. Patients states they are never, rarely angry. Patient states they are never, rarely unusually tired/fatigued. Pain experienced in the last 7 days has been none. Patient states that she has experienced no weight loss or gain in last 6 months.     Depression Screening:   PHQ-2 Score: 0      Fall Risk Screening:   In the past year, patient has experienced: no history of falling in  past year      Urinary Incontinence Screening:   Patient has not leaked urine accidently in the last six months.     Home Safety:  Patient does not have trouble with stairs inside or outside of their home. Patient has working smoke alarms and has working carbon monoxide detector. Home safety hazards include: none.     Nutrition:   Current diet is Regular and Other (please comment). Gluten Free    Medications:   Patient is not currently taking any over-the-counter supplements. Patient is not able to manage medications.     Activities of Daily Living (ADLs)/Instrumental Activities of Daily Living (IADLs):   Walk and transfer into and out of bed and chair?: No  Dress and groom yourself?: No    Bathe or shower yourself?: No    Feed yourself? No  Do your laundry/housekeeping?: No  Manage your money, pay your bills and track your expenses?: No  Make your own meals?: No    Do your own shopping?: No    Previous Hospitalizations:   Any hospitalizations or ED visits within the last 12 months?: No      Advance Care Planning:   Living will: No    Advanced directive: No      PREVENTIVE SCREENINGS      Cardiovascular Screening:    General: Screening Current      Colorectal Cancer Screening:     General: Screening Current      Lung Cancer Screening:     General: Screening Not Indicated    Screening, Brief Intervention, and Referral to Treatment (SBIRT)    Screening  Typical number of drinks in a day: 0  Typical number of drinks in a week: 0  Interpretation: Low risk drinking behavior.    Single Item Drug Screening:  How often have you used an illegal drug (including marijuana) or a prescription medication for non-medical reasons in the past year? never    Single Item Drug Screen Score: 0  Interpretation: Negative screen for possible drug use disorder    Review of Current Opioid Use    Opioid Risk Tool (ORT) Interpretation: Complete Opioid Risk Tool (ORT)    Social Drivers of Health     Financial Resource Strain: Low Risk   (1/10/2024)    Overall Financial Resource Strain (CARDIA)    • Difficulty of Paying Living Expenses: Not hard at all   Food Insecurity: No Food Insecurity (1/15/2025)    Hunger Vital Sign    • Worried About Running Out of Food in the Last Year: Never true    • Ran Out of Food in the Last Year: Never true   Transportation Needs: No Transportation Needs (1/15/2025)    PRAPARE - Transportation    • Lack of Transportation (Medical): No    • Lack of Transportation (Non-Medical): No   Housing Stability: Low Risk  (1/15/2025)    Housing Stability Vital Sign    • Unable to Pay for Housing in the Last Year: No    • Number of Times Moved in the Last Year: 0    • Homeless in the Last Year: No   Utilities: Not At Risk (1/15/2025)    [facility] Utilities    • Threatened with loss of utilities: No     No results found.    Objective   /78 (BP Location: Left arm, Patient Position: Sitting, Cuff Size: Standard)   Pulse 64   Temp (!) 97 °F (36.1 °C) (Temporal)   Wt 52.6 kg (116 lb)   SpO2 96%   BMI 30.16 kg/m²     Physical Exam  Vitals and nursing note reviewed.   Constitutional:       General: She is not in acute distress.     Appearance: She is well-developed.   HENT:      Head: Normocephalic and atraumatic.   Eyes:      Conjunctiva/sclera: Conjunctivae normal.   Cardiovascular:      Rate and Rhythm: Regular rhythm. Tachycardia present.      Heart sounds: No murmur heard.  Pulmonary:      Effort: Pulmonary effort is normal. No respiratory distress.      Breath sounds: Normal breath sounds.   Abdominal:      Palpations: Abdomen is soft.      Tenderness: There is no abdominal tenderness.   Musculoskeletal:         General: No swelling.      Cervical back: Neck supple.   Skin:     General: Skin is warm and dry.      Capillary Refill: Capillary refill takes less than 2 seconds.   Neurological:      Mental Status: She is alert.   Psychiatric:         Mood and Affect: Mood normal.     Down  syndrome with  dementia

## 2025-01-15 NOTE — PATIENT INSTRUCTIONS
Medicare Preventive Visit Patient Instructions  Thank you for completing your Welcome to Medicare Visit or Medicare Annual Wellness Visit today. Your next wellness visit will be due in one year (1/16/2026).  The screening/preventive services that you may require over the next 5-10 years are detailed below. Some tests may not apply to you based off risk factors and/or age. Screening tests ordered at today's visit but not completed yet may show as past due. Also, please note that scanned in results may not display below.  Preventive Screenings:  Service Recommendations Previous Testing/Comments   Colorectal Cancer Screening  * Colonoscopy    * Fecal Occult Blood Test (FOBT)/Fecal Immunochemical Test (FIT)  * Fecal DNA/Cologuard Test  * Flexible Sigmoidoscopy Age: 45-75 years old   Colonoscopy: every 10 years (may be performed more frequently if at higher risk)  OR  FOBT/FIT: every 1 year  OR  Cologuard: every 3 years  OR  Sigmoidoscopy: every 5 years  Screening may be recommended earlier than age 45 if at higher risk for colorectal cancer. Also, an individualized decision between you and your healthcare provider will decide whether screening between the ages of 76-85 would be appropriate. Colonoscopy: Not on file  FOBT/FIT: Not on file  Cologuard: 07/17/2023  Sigmoidoscopy: Not on file    Screening Current     Breast Cancer Screening Age: 40+ years old  Frequency: every 1-2 years  Not required if history of left and right mastectomy Mammogram: Not on file        Cervical Cancer Screening Between the ages of 21-29, pap smear recommended once every 3 years.   Between the ages of 30-65, can perform pap smear with HPV co-testing every 5 years.   Recommendations may differ for women with a history of total hysterectomy, cervical cancer, or abnormal pap smears in past. Pap Smear: Not on file        Hepatitis C Screening Once for adults born between 1945 and 1965  More frequently in patients at high risk for Hepatitis C Hep  C Antibody: Not on file        Diabetes Screening 1-2 times per year if you're at risk for diabetes or have pre-diabetes Fasting glucose: 72 mg/dL (11/4/2022)  A1C: 5.2 % (4/12/2021)      Cholesterol Screening Once every 5 years if you don't have a lipid disorder. May order more often based on risk factors. Lipid panel: 11/04/2022    Screening Current     Other Preventive Screenings Covered by Medicare:  Abdominal Aortic Aneurysm (AAA) Screening: covered once if your at risk. You're considered to be at risk if you have a family history of AAA.  Lung Cancer Screening: covers low dose CT scan once per year if you meet all of the following conditions: (1) Age 55-77; (2) No signs or symptoms of lung cancer; (3) Current smoker or have quit smoking within the last 15 years; (4) You have a tobacco smoking history of at least 20 pack years (packs per day multiplied by number of years you smoked); (5) You get a written order from a healthcare provider.  Glaucoma Screening: covered annually if you're considered high risk: (1) You have diabetes OR (2) Family history of glaucoma OR (3)  aged 50 and older OR (4)  American aged 65 and older  Osteoporosis Screening: covered every 2 years if you meet one of the following conditions: (1) You're estrogen deficient and at risk for osteoporosis based off medical history and other findings; (2) Have a vertebral abnormality; (3) On glucocorticoid therapy for more than 3 months; (4) Have primary hyperparathyroidism; (5) On osteoporosis medications and need to assess response to drug therapy.   Last bone density test (DXA Scan): Not on file.  HIV Screening: covered annually if you're between the age of 15-65. Also covered annually if you are younger than 15 and older than 65 with risk factors for HIV infection. For pregnant patients, it is covered up to 3 times per pregnancy.    Immunizations:  Immunization Recommendations   Influenza Vaccine Annual influenza  vaccination during flu season is recommended for all persons aged >= 6 months who do not have contraindications   Pneumococcal Vaccine   * Pneumococcal conjugate vaccine = PCV13 (Prevnar 13), PCV15 (Vaxneuvance), PCV20 (Prevnar 20)  * Pneumococcal polysaccharide vaccine = PPSV23 (Pneumovax) Adults 19-63 yo with certain risk factors or if 65+ yo  If never received any pneumonia vaccine: recommend Prevnar 20 (PCV20)  Give PCV20 if previously received 1 dose of PCV13 or PPSV23   Hepatitis B Vaccine 3 dose series if at intermediate or high risk (ex: diabetes, end stage renal disease, liver disease)   Respiratory syncytial virus (RSV) Vaccine - COVERED BY MEDICARE PART D  * RSVPreF3 (Arexvy) CDC recommends that adults 60 years of age and older may receive a single dose of RSV vaccine using shared clinical decision-making (SCDM)   Tetanus (Td) Vaccine - COST NOT COVERED BY MEDICARE PART B Following completion of primary series, a booster dose should be given every 10 years to maintain immunity against tetanus. Td may also be given as tetanus wound prophylaxis.   Tdap Vaccine - COST NOT COVERED BY MEDICARE PART B Recommended at least once for all adults. For pregnant patients, recommended with each pregnancy.   Shingles Vaccine (Shingrix) - COST NOT COVERED BY MEDICARE PART B  2 shot series recommended in those 19 years and older who have or will have weakened immune systems or those 50 years and older     Health Maintenance Due:      Topic Date Due   • Hepatitis C Screening  Never done   • HIV Screening  Never done   • Cervical Cancer Screening  Never done   • Breast Cancer Screening: Mammogram  Never done   • Colorectal Cancer Screening  07/17/2026     Immunizations Due:      Topic Date Due   • Influenza Vaccine (1) 09/01/2024   • COVID-19 Vaccine (5 - 2024-25 season) 09/01/2024     Advance Directives   What are advance directives?  Advance directives are legal documents that state your wishes and plans for medical care.  These plans are made ahead of time in case you lose your ability to make decisions for yourself. Advance directives can apply to any medical decision, such as the treatments you want, and if you want to donate organs.   What are the types of advance directives?  There are many types of advance directives, and each state has rules about how to use them. You may choose a combination of any of the following:  Living will:  This is a written record of the treatment you want. You can also choose which treatments you do not want, which to limit, and which to stop at a certain time. This includes surgery, medicine, IV fluid, and tube feedings.   Durable power of  for healthcare (DPAHC):  This is a written record that states who you want to make healthcare choices for you when you are unable to make them for yourself. This person, called a proxy, is usually a family member or a friend. You may choose more than 1 proxy.  Do not resuscitate (DNR) order:  A DNR order is used in case your heart stops beating or you stop breathing. It is a request not to have certain forms of treatment, such as CPR. A DNR order may be included in other types of advance directives.  Medical directive:  This covers the care that you want if you are in a coma, near death, or unable to make decisions for yourself. You can list the treatments you want for each condition. Treatment may include pain medicine, surgery, blood transfusions, dialysis, IV or tube feedings, and a ventilator (breathing machine).  Values history:  This document has questions about your views, beliefs, and how you feel and think about life. This information can help others choose the care that you would choose.  Why are advance directives important?  An advance directive helps you control your care. Although spoken wishes may be used, it is better to have your wishes written down. Spoken wishes can be misunderstood, or not followed. Treatments may be given even if you  do not want them. An advance directive may make it easier for your family to make difficult choices about your care.   Narcotic (Opioid) Safety    Use narcotics safely:  Take prescribed narcotics exactly as directed  Do not give narcotics to others or take narcotics that belong to someone else  Do not mix narcotics without medicines or alcohol  Do not drive or operate heavy machinery after you take the narcotic  Monitor for side effects and notify your healthcare provider if you experienced side effects such as nausea, sleepiness, itching, or trouble thinking clearly.    Manage constipation:    Constipation is the most common side effect of narcotic medicine. Constipation is when you have hard, dry bowel movements, or you go longer than usual between bowel movements. Tell your healthcare provider about all changes in your bowel movements while you are taking narcotics. He or she may recommend laxative medicine to help you have a bowel movement. He or she may also change the kind of narcotic you are taking, or change when you take it. The following are more ways you can prevent or relieve constipation:    Drink liquids as directed.  You may need to drink extra liquids to help soften and move your bowels. Ask how much liquid to drink each day and which liquids are best for you.  Eat high-fiber foods.  This may help decrease constipation by adding bulk to your bowel movements. High-fiber foods include fruits, vegetables, whole-grain breads and cereals, and beans. Your healthcare provider or dietitian can help you create a high-fiber meal plan. Your provider may also recommend a fiber supplement if you cannot get enough fiber from food.  Exercise regularly.  Regular physical activity can help stimulate your intestines. Walking is a good exercise to prevent or relieve constipation. Ask which exercises are best for you.  Schedule a time each day to have a bowel movement.  This may help train your body to have regular  bowel movements. Bend forward while you are on the toilet to help move the bowel movement out. Sit on the toilet for at least 10 minutes, even if you do not have a bowel movement.    Store narcotics safely:   Store narcotics where others cannot easily get them.  Keep them in a locked cabinet or secure area. Do not  keep them in a purse or other bag you carry with you. A person may be looking for something else and find the narcotics.  Make sure narcotics are stored out of the reach of children.  A child can easily overdose on narcotics. Narcotics may look like candy to a small child.    The best way to dispose of narcotics:      The laws vary by country and area. In the [place], the best way is to return the narcotics through a take-back program. This program is offered by the [place] Drug Enforcement Agency ([employer]). The following are options for using the program:  Take the narcotics to a [employer] collection site.  The site is often a law enforcement center. Call your local law enforcement center for scheduled take-back days in your area. You will be given information on where to go if the collection site is in a different location.  Take the narcotics to an approved pharmacy or hospital.  A pharmacy or hospital may be set up as a collection site. You will need to ask if it is a [employer] collection site if you were not directed there. A pharmacy or doctor's office may not be able to take back narcotics unless it is a [employer] site.  Use a mail-back system.  This means you are given containers to put the narcotics into. You will then mail them in the containers.  Use a take-back drop box.  This is a place to leave the narcotics at any time. People and animals will not be able to get into the box. Your local law enforcement agency can tell you where to find a drop box in your area.    Other ways to manage pain:   Ask your healthcare provider about non-narcotic medicines to control pain.  Nonprescription medicines include NSAIDs  (such as ibuprofen) and acetaminophen. Prescription medicines include muscle relaxers, antidepressants, and steroids.  Pain may be managed without any medicines.  Some ways to relieve pain include massage, aromatherapy, or meditation. Physical or occupational therapy may also help.    For more information:   Drug Enforcement Administration  [address] [place] [address]  Phone: 8- 926 - 266-3545  Web Address: https://www.deadiversion.[facility] [place]drug_disposal/     Food and Drug Administration  [address] [place] [address]  Phone: 0- 305 - 937-8538  Web Address: http://www.fda.gov     © Copyright [employer] 2018 Information is for End User's use only and may not be sold, redistributed or otherwise used for commercial purposes. All illustrations and images included in CareNotes® are the copyrighted property of [employer].A[employer] Inc[employer] or [employer]

## 2025-02-13 LAB
ALBUMIN SERPL-MCNC: 3.8 G/DL (ref 3.5–5.7)
ALP SERPL-CCNC: 94 U/L (ref 35–120)
ALT SERPL-CCNC: 25 U/L
ANION GAP SERPL CALCULATED.3IONS-SCNC: 6 MMOL/L (ref 3–11)
AST SERPL-CCNC: 35 U/L
BASOPHILS # BLD AUTO: 0.1 THOU/CMM (ref 0–0.1)
BASOPHILS NFR BLD AUTO: 1 %
BILIRUB SERPL-MCNC: 0.7 MG/DL (ref 0.2–1)
BUN SERPL-MCNC: 22 MG/DL (ref 7–25)
CALCIUM SERPL-MCNC: 8.3 MG/DL (ref 8.5–10.5)
CHLORIDE SERPL-SCNC: 104 MMOL/L (ref 100–109)
CHOLEST SERPL-MCNC: 179 MG/DL
CHOLEST/HDLC SERPL: 3.6 {RATIO}
CO2 SERPL-SCNC: 29 MMOL/L (ref 21–31)
CREAT SERPL-MCNC: 1.08 MG/DL (ref 0.4–1.1)
CYTOLOGY CMNT CVX/VAG CYTO-IMP: ABNORMAL
DIFFERENTIAL METHOD BLD: ABNORMAL
EOSINOPHIL # BLD AUTO: 0 THOU/CMM (ref 0–0.5)
EOSINOPHIL NFR BLD AUTO: 0 %
ERYTHROCYTE [DISTWIDTH] IN BLOOD BY AUTOMATED COUNT: 13.8 % (ref 12–16)
GFR/BSA.PRED SERPLBLD CYS-BASED-ARV: 62 ML/MIN/{1.73_M2}
GLUCOSE SERPL-MCNC: 93 MG/DL (ref 65–99)
HCT VFR BLD AUTO: 43.4 % (ref 35–43)
HDLC SERPL-MCNC: 50 MG/DL (ref 23–92)
HGB BLD-MCNC: 14.5 G/DL (ref 11.5–14.5)
LDLC SERPL CALC-MCNC: 102 MG/DL
LYMPHOCYTES # BLD AUTO: 0.6 THOU/CMM (ref 1–3)
LYMPHOCYTES NFR BLD AUTO: 8 %
MCH RBC QN AUTO: 34.3 PG (ref 26–34)
MCHC RBC AUTO-ENTMCNC: 33.4 G/DL (ref 32–37)
MCV RBC AUTO: 103 FL (ref 80–100)
MONOCYTES # BLD AUTO: 0.5 THOU/CMM (ref 0.3–1)
MONOCYTES NFR BLD AUTO: 6 %
NEUTROPHILS # BLD AUTO: 6.6 THOU/CMM (ref 1.8–7.8)
NEUTROPHILS NFR BLD AUTO: 85 %
NONHDLC SERPL-MCNC: 129 MG/DL
PLATELET # BLD AUTO: 196 THOU/CMM (ref 140–350)
PMV BLD REES-ECKER: 9.5 FL (ref 7.5–11.3)
POTASSIUM SERPL-SCNC: 5.8 MMOL/L (ref 3.5–5.2)
PROT SERPL-MCNC: 7.4 G/DL (ref 6.3–8.3)
RBC # BLD AUTO: 4.24 MILL/CMM (ref 3.7–4.7)
SODIUM SERPL-SCNC: 139 MMOL/L (ref 135–145)
TRIGL SERPL-MCNC: 135 MG/DL
WBC # BLD AUTO: 7.8 THOU/CMM (ref 4–10)

## 2025-06-10 ENCOUNTER — TELEPHONE (OUTPATIENT)
Age: 50
End: 2025-06-10

## 2025-06-10 NOTE — TELEPHONE ENCOUNTER
Patients mother states patient has a bad cold with a lot of coughing and mucus, no fever. She doesn't know what to give her as she is on a lot of medication. I offered her an appt with Dr Bhagat tomorrow, she declined stating patient is too difficult. She would like Dr Bhagat to address.

## (undated) DEVICE — SINGLE PORT MANIFOLD: Brand: NEPTUNE 2

## (undated) DEVICE — STRETCH BANDAGE: Brand: CURITY

## (undated) DEVICE — SYRINGE 30ML LL

## (undated) DEVICE — SKIN MARKER DUAL TIP WITH RULER CAP, FLEXIBLE RULER AND LABELS: Brand: DEVON

## (undated) DEVICE — BASIC PACK: Brand: CONVERTORS

## (undated) DEVICE — BULB SYRINGE,IRRIGATION WITH PROTECTIVE CAP: Brand: DOVER

## (undated) DEVICE — STERILE POLYISOPRENE POWDER-FREE SURGICAL GLOVES: Brand: PROTEXIS

## (undated) DEVICE — CABLE BIPOLAR DISP MEGADYNE

## (undated) DEVICE — SPONGE 4 X 4 XRAY 16 PLY STRL LF RFD

## (undated) DEVICE — SUT ETHILON 4-0 P-S 18 IN 699H

## (undated) DEVICE — SYRINGE 10ML LL CONTROL TOP

## (undated) DEVICE — NEEDLE 25G X 1 1/2

## (undated) DEVICE — 1820 FOAM BLOCK NEEDLE COUNTER: Brand: DEVON

## (undated) DEVICE — LIGHT GLOVE GREEN

## (undated) DEVICE — TIBURON SPLIT SHEET: Brand: CONVERTORS

## (undated) DEVICE — INTENDED FOR TISSUE SEPARATION, AND OTHER PROCEDURES THAT REQUIRE A SHARP SURGICAL BLADE TO PUNCTURE OR CUT.: Brand: BARD-PARKER SAFETY BLADES SIZE 15, STERILE

## (undated) DEVICE — PENCIL ELECTROSURG E-Z CLEAN -0035H

## (undated) DEVICE — TUBING SUCTION 5MM X 12 FT

## (undated) DEVICE — NEEDLE BLUNT 18 G X 1 1/2IN